# Patient Record
Sex: MALE | Race: WHITE | NOT HISPANIC OR LATINO | URBAN - METROPOLITAN AREA
[De-identification: names, ages, dates, MRNs, and addresses within clinical notes are randomized per-mention and may not be internally consistent; named-entity substitution may affect disease eponyms.]

---

## 2019-12-21 ENCOUNTER — INPATIENT (INPATIENT)
Facility: HOSPITAL | Age: 54
LOS: 2 days | Discharge: HOME | End: 2019-12-24
Attending: INTERNAL MEDICINE | Admitting: INTERNAL MEDICINE
Payer: COMMERCIAL

## 2019-12-21 VITALS
DIASTOLIC BLOOD PRESSURE: 65 MMHG | HEART RATE: 68 BPM | WEIGHT: 214.95 LBS | SYSTOLIC BLOOD PRESSURE: 133 MMHG | RESPIRATION RATE: 18 BRPM | TEMPERATURE: 99 F | OXYGEN SATURATION: 98 %

## 2019-12-21 LAB
ALBUMIN SERPL ELPH-MCNC: 4.5 G/DL — SIGNIFICANT CHANGE UP (ref 3.5–5.2)
ALP SERPL-CCNC: 101 U/L — SIGNIFICANT CHANGE UP (ref 30–115)
ALT FLD-CCNC: 31 U/L — SIGNIFICANT CHANGE UP (ref 0–41)
ANION GAP SERPL CALC-SCNC: 18 MMOL/L — HIGH (ref 7–14)
AST SERPL-CCNC: 18 U/L — SIGNIFICANT CHANGE UP (ref 0–41)
BASOPHILS # BLD AUTO: 0.1 K/UL — SIGNIFICANT CHANGE UP (ref 0–0.2)
BASOPHILS NFR BLD AUTO: 0.5 % — SIGNIFICANT CHANGE UP (ref 0–1)
BILIRUB SERPL-MCNC: 0.4 MG/DL — SIGNIFICANT CHANGE UP (ref 0.2–1.2)
BUN SERPL-MCNC: 15 MG/DL — SIGNIFICANT CHANGE UP (ref 10–20)
CALCIUM SERPL-MCNC: 9.3 MG/DL — SIGNIFICANT CHANGE UP (ref 8.5–10.1)
CHLORIDE SERPL-SCNC: 102 MMOL/L — SIGNIFICANT CHANGE UP (ref 98–110)
CO2 SERPL-SCNC: 22 MMOL/L — SIGNIFICANT CHANGE UP (ref 17–32)
CREAT SERPL-MCNC: 1.2 MG/DL — SIGNIFICANT CHANGE UP (ref 0.7–1.5)
EOSINOPHIL # BLD AUTO: 0.1 K/UL — SIGNIFICANT CHANGE UP (ref 0–0.7)
EOSINOPHIL NFR BLD AUTO: 0.5 % — SIGNIFICANT CHANGE UP (ref 0–8)
GLUCOSE SERPL-MCNC: 182 MG/DL — HIGH (ref 70–99)
HCT VFR BLD CALC: 44.1 % — SIGNIFICANT CHANGE UP (ref 42–52)
HGB BLD-MCNC: 14.3 G/DL — SIGNIFICANT CHANGE UP (ref 14–18)
IMM GRANULOCYTES NFR BLD AUTO: 0.5 % — HIGH (ref 0.1–0.3)
LYMPHOCYTES # BLD AUTO: 12.2 % — LOW (ref 20.5–51.1)
LYMPHOCYTES # BLD AUTO: 2.26 K/UL — SIGNIFICANT CHANGE UP (ref 1.2–3.4)
MAGNESIUM SERPL-MCNC: 2.1 MG/DL — SIGNIFICANT CHANGE UP (ref 1.8–2.4)
MCHC RBC-ENTMCNC: 27.2 PG — SIGNIFICANT CHANGE UP (ref 27–31)
MCHC RBC-ENTMCNC: 32.4 G/DL — SIGNIFICANT CHANGE UP (ref 32–37)
MCV RBC AUTO: 83.8 FL — SIGNIFICANT CHANGE UP (ref 80–94)
MONOCYTES # BLD AUTO: 1.16 K/UL — HIGH (ref 0.1–0.6)
MONOCYTES NFR BLD AUTO: 6.3 % — SIGNIFICANT CHANGE UP (ref 1.7–9.3)
NEUTROPHILS # BLD AUTO: 14.83 K/UL — HIGH (ref 1.4–6.5)
NEUTROPHILS NFR BLD AUTO: 80 % — HIGH (ref 42.2–75.2)
NRBC # BLD: 0 /100 WBCS — SIGNIFICANT CHANGE UP (ref 0–0)
PLATELET # BLD AUTO: 345 K/UL — SIGNIFICANT CHANGE UP (ref 130–400)
POTASSIUM SERPL-MCNC: 3.3 MMOL/L — LOW (ref 3.5–5)
POTASSIUM SERPL-SCNC: 3.3 MMOL/L — LOW (ref 3.5–5)
PROT SERPL-MCNC: 7.5 G/DL — SIGNIFICANT CHANGE UP (ref 6–8)
RBC # BLD: 5.26 M/UL — SIGNIFICANT CHANGE UP (ref 4.7–6.1)
RBC # FLD: 13.3 % — SIGNIFICANT CHANGE UP (ref 11.5–14.5)
SODIUM SERPL-SCNC: 142 MMOL/L — SIGNIFICANT CHANGE UP (ref 135–146)
TROPONIN T SERPL-MCNC: <0.01 NG/ML — SIGNIFICANT CHANGE UP
WBC # BLD: 18.55 K/UL — HIGH (ref 4.8–10.8)
WBC # FLD AUTO: 18.55 K/UL — HIGH (ref 4.8–10.8)

## 2019-12-21 PROCEDURE — 99285 EMERGENCY DEPT VISIT HI MDM: CPT

## 2019-12-21 PROCEDURE — 93010 ELECTROCARDIOGRAM REPORT: CPT

## 2019-12-21 RX ORDER — SODIUM CHLORIDE 9 MG/ML
1000 INJECTION, SOLUTION INTRAVENOUS ONCE
Refills: 0 | Status: COMPLETED | OUTPATIENT
Start: 2019-12-21 | End: 2019-12-21

## 2019-12-21 RX ORDER — PROCHLORPERAZINE MALEATE 5 MG
10 TABLET ORAL ONCE
Refills: 0 | Status: COMPLETED | OUTPATIENT
Start: 2019-12-21 | End: 2019-12-21

## 2019-12-21 RX ORDER — SODIUM CHLORIDE 9 MG/ML
1000 INJECTION INTRAMUSCULAR; INTRAVENOUS; SUBCUTANEOUS ONCE
Refills: 0 | Status: COMPLETED | OUTPATIENT
Start: 2019-12-21 | End: 2019-12-21

## 2019-12-21 RX ORDER — ONDANSETRON 8 MG/1
4 TABLET, FILM COATED ORAL ONCE
Refills: 0 | Status: COMPLETED | OUTPATIENT
Start: 2019-12-21 | End: 2019-12-21

## 2019-12-21 RX ADMIN — SODIUM CHLORIDE 1000 MILLILITER(S): 9 INJECTION INTRAMUSCULAR; INTRAVENOUS; SUBCUTANEOUS at 21:12

## 2019-12-21 RX ADMIN — SODIUM CHLORIDE 1000 MILLILITER(S): 9 INJECTION INTRAMUSCULAR; INTRAVENOUS; SUBCUTANEOUS at 23:00

## 2019-12-21 RX ADMIN — SODIUM CHLORIDE 1000 MILLILITER(S): 9 INJECTION, SOLUTION INTRAVENOUS at 23:58

## 2019-12-21 RX ADMIN — Medication 104 MILLIGRAM(S): at 21:15

## 2019-12-21 RX ADMIN — ONDANSETRON 4 MILLIGRAM(S): 8 TABLET, FILM COATED ORAL at 21:54

## 2019-12-21 RX ADMIN — Medication 10 MILLIGRAM(S): at 22:15

## 2019-12-21 NOTE — ED PROVIDER NOTE - PROGRESS NOTE DETAILS
patient feels better but still has trouble on ambulation. + ataxia.   spoke with neuro NP Marquise, request CTA head/neck. if neg, patient's NIH scale is 0, can be placed in obs for MRI CTA head/neck neg for large vessels occlusion, will place in obs for MRI

## 2019-12-21 NOTE — ED PROVIDER NOTE - PHYSICAL EXAMINATION
CONSTITUTIONAL: Well-appearing; well-nourished; in no apparent distress.   EYES: PERRL; EOM intact.   ENT: normal nose; no rhinorrhea; normal pharynx with no tonsillar hypertrophy.   NECK: Supple; non-tender; no cervical lymphadenopathy. No JVD.   CARDIOVASCULAR: Normal S1, S2; no murmurs, rubs, or gallops.   RESPIRATORY: Normal chest excursion with respiration; breath sounds clear and equal bilaterally; no wheezes, rhonchi, or rales.  GI/: Normal bowel sounds; non-distended; non-tender; no palpable organomegaly.   MS: No evidence of trauma or deformity. Non-tender to palpation. No scoliosis. No CVA tenderness. Normal ROM in all four extremities; non-tender to palpation; distal pulses are normal.   SKIN: Normal for age and race; warm; dry; good turgor; no apparent lesions or exudate.   NEURO/PSYCH: A & O x 4; CN II-XII grossly unremarkable. no drifting. strength equal to b/l upper and lower extremities. speaking coherently. nml cerebellum test.

## 2019-12-21 NOTE — ED ADULT NURSE NOTE - ED STAT RN HANDOFF DETAILS
ENDORSED PT TO STROKE UNIT RN, PT ON CARDIAC MONITOR, IV PATENT, ALERT AND ORIENTED, WILL CONTINUE TO MONITOR

## 2019-12-21 NOTE — ED PROVIDER NOTE - CLINICAL SUMMARY MEDICAL DECISION MAKING FREE TEXT BOX
Pt in ER c/o dizziness, ataxia, n/v.  No neuro deficits in ER.  Head Ct neg.  CTA head/neck with no large vessel occlusion.  seen by neuro in ER, rec. obs stay for further w/u.

## 2019-12-21 NOTE — ED ADULT NURSE NOTE - OBJECTIVE STATEMENT
Pt came to ED c/o vomiting, dizziness and weakness. As per pt he was at work then got dizzy, weak and started vomiting. States had 3 episodes of vomiting for today. Denies headache, N/V/D, chest pain. A,Ox4, no SOB or distress at this time.

## 2019-12-21 NOTE — ED PROVIDER NOTE - NS ED ROS FT
Constitutional: no fever, chills, no recent weight loss, change in appetite or malaise  Eyes: no redness/discharge/pain/vision changes  ENT: no rhinorrhea/ear pain/sore throat  Cardiac: No chest pain, SOB or edema.  Respiratory: No cough or respiratory distress  GI: + nausea, vomiting no diarrhea or abdominal pain.  : No dysuria, frequency, urgency or hematuria  MS: no pain to back or extremities, no loss of ROM, no weakness  Neuro: + dizziness. No headache or weakness. No LOC.  Skin: No skin rash.  Endocrine: No history of thyroid disease or diabetes.

## 2019-12-21 NOTE — ED ADULT NURSE NOTE - NSIMPLEMENTINTERV_GEN_ALL_ED
Implemented All Universal Safety Interventions:  [address] to call system. Call bell, personal items and telephone within reach. Instruct patient to call for assistance. Room bathroom lighting operational. Non-slip footwear when patient is off stretcher. Physically safe environment: no spills, clutter or unnecessary equipment. Stretcher in lowest position, wheels locked, appropriate side rails in place.

## 2019-12-21 NOTE — ED PROVIDER NOTE - ATTENDING CONTRIBUTION TO CARE
52 y/o male with no sig pmhx in ER with c/o feeling dizzy.  Pt states he was eating chinese this evening for dinner and shortly after he started eating he became diaphoretic and felt very lightheaded/dizzy.  felt like the room was spinning, worse when moving, difficulty walking due to dizziness. No syncope/near syncope.  + nausea and multiple episodes of vomiting.  no HA. no visual changes. no cp/sob.  No abdominal pain initially, states abdomen feels sore now after vomiting.  no motor weakness or paresthesias. Pt feeling better now.   PE - nad, nc/at, eomi, perrl, op - clear, mmm, neck supple, cta b/l, no w/r/r, rrr, abd- soft, nt/nd, nabs, from x 4, no swelling, A&O x 3, cn 2-12 intact, clear speech, motor 5/5 b/l UE/LE, no sensory deficits, FTN and HKS intact b/l.  -ivf, check labs, head ct, re-eval.

## 2019-12-21 NOTE — ED PROVIDER NOTE - OBJECTIVE STATEMENT
54 yo male no sig hx present c/o dizziness and vomiting started about 1 hour ago. patient reported he just started eating his dinner and suddenly felt warm and sweaty. He then started feeling dizzy and unable to walk 52 yo male no sig hx present c/o dizziness and vomiting started about 1 hour ago. patient reported he just started eating his dinner and suddenly felt warm and sweaty. He then started feeling dizzy and unable to walk so he called his friend at work who called EMS. Patient then started multiple NB/NB vomiting. denies HA/chest pain/abd pain/diarrhea/urinary sxs/ ear pain and tinnitus. denies hx of vertigo.

## 2019-12-22 LAB
APPEARANCE UR: CLEAR — SIGNIFICANT CHANGE UP
BILIRUB UR-MCNC: NEGATIVE — SIGNIFICANT CHANGE UP
COLOR SPEC: SIGNIFICANT CHANGE UP
DIFF PNL FLD: NEGATIVE — SIGNIFICANT CHANGE UP
GLUCOSE UR QL: NEGATIVE — SIGNIFICANT CHANGE UP
KETONES UR-MCNC: NEGATIVE — SIGNIFICANT CHANGE UP
LEUKOCYTE ESTERASE UR-ACNC: NEGATIVE — SIGNIFICANT CHANGE UP
NITRITE UR-MCNC: NEGATIVE — SIGNIFICANT CHANGE UP
PH UR: 6 — SIGNIFICANT CHANGE UP (ref 5–8)
PROT UR-MCNC: NEGATIVE — SIGNIFICANT CHANGE UP
SP GR SPEC: 1.01 — SIGNIFICANT CHANGE UP (ref 1.01–1.02)
UROBILINOGEN FLD QL: SIGNIFICANT CHANGE UP

## 2019-12-22 PROCEDURE — 99253 IP/OBS CNSLTJ NEW/EST LOW 45: CPT

## 2019-12-22 PROCEDURE — 99221 1ST HOSP IP/OBS SF/LOW 40: CPT | Mod: GC

## 2019-12-22 PROCEDURE — 70498 CT ANGIOGRAPHY NECK: CPT | Mod: 26

## 2019-12-22 PROCEDURE — 70450 CT HEAD/BRAIN W/O DYE: CPT | Mod: 26,59

## 2019-12-22 PROCEDURE — 71045 X-RAY EXAM CHEST 1 VIEW: CPT | Mod: 26

## 2019-12-22 PROCEDURE — 99220: CPT

## 2019-12-22 PROCEDURE — 70496 CT ANGIOGRAPHY HEAD: CPT | Mod: 26

## 2019-12-22 PROCEDURE — 70551 MRI BRAIN STEM W/O DYE: CPT | Mod: 26

## 2019-12-22 RX ORDER — HEPARIN SODIUM 5000 [USP'U]/ML
5000 INJECTION INTRAVENOUS; SUBCUTANEOUS EVERY 8 HOURS
Refills: 0 | Status: DISCONTINUED | OUTPATIENT
Start: 2019-12-22 | End: 2019-12-24

## 2019-12-22 RX ORDER — POTASSIUM CHLORIDE 20 MEQ
40 PACKET (EA) ORAL ONCE
Refills: 0 | Status: DISCONTINUED | OUTPATIENT
Start: 2019-12-22 | End: 2019-12-22

## 2019-12-22 RX ORDER — SODIUM CHLORIDE 9 MG/ML
1000 INJECTION INTRAMUSCULAR; INTRAVENOUS; SUBCUTANEOUS
Refills: 0 | Status: DISCONTINUED | OUTPATIENT
Start: 2019-12-22 | End: 2019-12-24

## 2019-12-22 RX ORDER — POTASSIUM CHLORIDE 20 MEQ
40 PACKET (EA) ORAL ONCE
Refills: 0 | Status: COMPLETED | OUTPATIENT
Start: 2019-12-22 | End: 2019-12-22

## 2019-12-22 RX ORDER — PANTOPRAZOLE SODIUM 20 MG/1
40 TABLET, DELAYED RELEASE ORAL
Refills: 0 | Status: DISCONTINUED | OUTPATIENT
Start: 2019-12-22 | End: 2019-12-24

## 2019-12-22 RX ORDER — CLOPIDOGREL BISULFATE 75 MG/1
75 TABLET, FILM COATED ORAL DAILY
Refills: 0 | Status: DISCONTINUED | OUTPATIENT
Start: 2019-12-22 | End: 2019-12-22

## 2019-12-22 RX ORDER — INFLUENZA VIRUS VACCINE 15; 15; 15; 15 UG/.5ML; UG/.5ML; UG/.5ML; UG/.5ML
0.5 SUSPENSION INTRAMUSCULAR ONCE
Refills: 0 | Status: DISCONTINUED | OUTPATIENT
Start: 2019-12-22 | End: 2019-12-24

## 2019-12-22 RX ORDER — ATORVASTATIN CALCIUM 80 MG/1
80 TABLET, FILM COATED ORAL AT BEDTIME
Refills: 0 | Status: DISCONTINUED | OUTPATIENT
Start: 2019-12-22 | End: 2019-12-24

## 2019-12-22 RX ORDER — ASPIRIN/CALCIUM CARB/MAGNESIUM 324 MG
324 TABLET ORAL ONCE
Refills: 0 | Status: COMPLETED | OUTPATIENT
Start: 2019-12-22 | End: 2019-12-22

## 2019-12-22 RX ORDER — SENNA PLUS 8.6 MG/1
1 TABLET ORAL DAILY
Refills: 0 | Status: DISCONTINUED | OUTPATIENT
Start: 2019-12-22 | End: 2019-12-24

## 2019-12-22 RX ORDER — ONDANSETRON 8 MG/1
4 TABLET, FILM COATED ORAL ONCE
Refills: 0 | Status: COMPLETED | OUTPATIENT
Start: 2019-12-22 | End: 2019-12-22

## 2019-12-22 RX ORDER — MECLIZINE HCL 12.5 MG
25 TABLET ORAL EVERY 6 HOURS
Refills: 0 | Status: DISCONTINUED | OUTPATIENT
Start: 2019-12-22 | End: 2019-12-24

## 2019-12-22 RX ORDER — SIMVASTATIN 20 MG/1
40 TABLET, FILM COATED ORAL AT BEDTIME
Refills: 0 | Status: DISCONTINUED | OUTPATIENT
Start: 2019-12-22 | End: 2019-12-22

## 2019-12-22 RX ORDER — POLYETHYLENE GLYCOL 3350 17 G/17G
17 POWDER, FOR SOLUTION ORAL DAILY
Refills: 0 | Status: DISCONTINUED | OUTPATIENT
Start: 2019-12-22 | End: 2019-12-24

## 2019-12-22 RX ORDER — ASPIRIN/CALCIUM CARB/MAGNESIUM 324 MG
81 TABLET ORAL DAILY
Refills: 0 | Status: DISCONTINUED | OUTPATIENT
Start: 2019-12-22 | End: 2019-12-24

## 2019-12-22 RX ORDER — ASPIRIN/CALCIUM CARB/MAGNESIUM 324 MG
300 TABLET ORAL DAILY
Refills: 0 | Status: DISCONTINUED | OUTPATIENT
Start: 2019-12-22 | End: 2019-12-22

## 2019-12-22 RX ADMIN — Medication 40 MILLIEQUIVALENT(S): at 21:50

## 2019-12-22 RX ADMIN — HEPARIN SODIUM 5000 UNIT(S): 5000 INJECTION INTRAVENOUS; SUBCUTANEOUS at 21:50

## 2019-12-22 RX ADMIN — ONDANSETRON 4 MILLIGRAM(S): 8 TABLET, FILM COATED ORAL at 03:42

## 2019-12-22 RX ADMIN — ATORVASTATIN CALCIUM 80 MILLIGRAM(S): 80 TABLET, FILM COATED ORAL at 21:49

## 2019-12-22 RX ADMIN — HEPARIN SODIUM 5000 UNIT(S): 5000 INJECTION INTRAVENOUS; SUBCUTANEOUS at 17:23

## 2019-12-22 RX ADMIN — SODIUM CHLORIDE 1000 MILLILITER(S): 9 INJECTION, SOLUTION INTRAVENOUS at 06:00

## 2019-12-22 RX ADMIN — Medication 324 MILLIGRAM(S): at 06:21

## 2019-12-22 RX ADMIN — Medication 25 MILLIGRAM(S): at 06:21

## 2019-12-22 RX ADMIN — CLOPIDOGREL BISULFATE 75 MILLIGRAM(S): 75 TABLET, FILM COATED ORAL at 12:10

## 2019-12-22 RX ADMIN — SODIUM CHLORIDE 50 MILLILITER(S): 9 INJECTION INTRAMUSCULAR; INTRAVENOUS; SUBCUTANEOUS at 15:42

## 2019-12-22 NOTE — ED ADULT NURSE REASSESSMENT NOTE - NS ED NURSE REASSESS COMMENT FT1
pt assessed, no signs of distress, resting comfortably, awaiting MRI, VSS, will cont to assess and monitor.

## 2019-12-22 NOTE — ED CDU PROVIDER INITIAL DAY NOTE - MEDICAL DECISION MAKING DETAILS
placed in observation with neurology consult for evaluation of posterior vertigo associated with vomiting, will obtain mri for further evaluation.

## 2019-12-22 NOTE — H&P ADULT - HISTORY OF PRESENT ILLNESS
53 year old gentleman with no significant PMH presents to the ED with acute onset room spinning dizziness. Patient developed dizziness at 1800 with associated nausea and vomiting. Compazine and Zofran effective but gait ataxia persists.   patient reported he just started eating his dinner and suddenly felt warm and sweaty. He then started feeling dizzy and unable to walk so he called his friend at work who called EMS. Patient then started multiple NB/NB vomiting. denies HA/chest pain/abd pain/diarrhea/urinary sxs/ ear pain and tinnitus. denies hx of vertigo.    vitals on admission were as: 133/65 mmHg- 68 beats/min - 98% room air- temp 98.8 F    CT angio head and neck and CT head were negative   MRI positive for acute cerebellar ischemia   admitted to stroke unit 53 year old gentleman with no significant PMH presents to the ED with acute onset room spinning dizziness. Patient developed dizziness at 1800 with associated nausea and vomiting. Compazine and Zofran effective but gait ataxia persists.   patient reported he just started eating his dinner and suddenly felt warm and sweaty. He then started feeling dizzy and unable to walk so he called his friend at work who called EMS. Patient then started multiple NB/NB vomiting. denies HA/chest pain/abd pain/diarrhea/urinary sxs/ ear pain and tinnitus. denies hx of vertigo.  patient never took aspirin over the counter     vitals on admission were as: 133/65 mmHg- 68 beats/min - 98% room air- temp 98.8 F    CT angio head and neck and CT head were negative   MRI positive for acute cerebellar ischemia   admitted to stroke unit

## 2019-12-22 NOTE — ED CDU PROVIDER INITIAL DAY NOTE - OBJECTIVE STATEMENT
54y/o male with no significant pmh, pt. c/o acute onset of dizziness associated with vomiting. sx started pta. dizziness is described as room spinning sensation. dizziness is worse when ambulating. + unsteady gait. denies ha, focal weakness, slurred speech, cp, sob, abdominal pain. not a smoker. no family hx of cva. + family hx of cardiac disease.

## 2019-12-22 NOTE — ED CDU PROVIDER DISPOSITION NOTE - CLINICAL COURSE
patient evaluated for vertigo. placed in obs after nml head ct, MRI shows acute infarct. discussed with neurology, will start on plavix, statin, recommended PAOLA and hypercoagulable work up

## 2019-12-22 NOTE — ED CDU PROVIDER INITIAL DAY NOTE - ATTENDING CONTRIBUTION TO CARE
Vertigo occuring at 1800 with associated nausea and vomiting. improved with compazine/meclizine/zofran however intially patient had worsening symptoms with wakling and gait ataxia persists. CTH was nml. placed in obs for posterior stroke work up.

## 2019-12-22 NOTE — H&P ADULT - ATTENDING COMMENTS
53 year old gentleman with no significant PMH presents to the ED with acute onset room spinning dizziness. Patient developed dizziness at 1800 with associated nausea and vomiting. Compazine and Zofran effective but gait ataxia persists. Patient reported he just started eating his dinner and suddenly felt warm and sweaty. He then started feeling dizzy and unable to walk so he called his friend at work who called EMS. Patient then started multiple NB/NB vomiting. denies HA/chest pain/abd pain/diarrhea/urinary sxs/ ear pain and tinnitus. denies hx of vertigo.  patient never took aspirin over the counter.   Today feels better. Father with CVA in his 40s but smoker.    Denies CP, SOB, N/V/D/C/AP, cough, F, chills, new focal weakness, HA, vision changes, dysuria, or urinary symptoms, blood in stool.    ROS: all systems unremarkable except as above.     Gen: NAD, AA0x3  HEENT: PERRLA, EOMI, no LAD  CV: nl S1 S2  Resp: decreased BS b/l  GI: NT/ND/S +BS  MS: no c/c/e, +pulses  Neuro: nonfocal, +reflexes    EKG - nonspecific changes (my read)  Chart and consultant notes personally reviewed.  Care Discussed with Consultants/Other Providers/ Housestaff [ x] YES [ ] NO   Radiology, labs, old records personally reviewed.    #Acute CVA  etiology : ischemic stroke; MRI showed Punctate acute infarct in the medial left cerebellar hemisphere/vermis.  Cont stroke unit, monitor telemetry   - aspirin 81 mg, Atorvastatin 80mg as per neurology ( as pt was never on aspirin)   - f/u hypercoagulable work up   - Activity: ambulate with assistance, neuro checks Q4H  - Fall risk precautions  - Fu neurology   - will keep meclizine as has dizziness on position change   - avoid hypotension keep bp > 120 mmHg   -possible JUDIE - outpt sleep study  -will need event monitor/loop on d/c    #elevated WBC with left shift  reactive due to vomiting  no signs of infection       GI ppx: Po protonix 40 mg QD  DVT ppx: Heparin SC   Activity : Physical therapy, rehab   Diet : DASH/TLC  Dispo: Home   Full Code     #Progress Note Handoff  Pending (specify): Neuro, PT clearance__  Pt/Family discussion: Pt/family informed and agree with the current plan  Disposition: Home______/SNF_______/4A________/To be determined_x_______/Waiting for Auth_____

## 2019-12-22 NOTE — H&P ADULT - NSICDXFAMILYHX_GEN_ALL_CORE_FT
FAMILY HISTORY:  Father  Still living? Unknown  Family history of coronary artery disease, Age at diagnosis: 41-50 FAMILY HISTORY:  Family history of cerebrovascular accident (CVA) in father, in his 40s    Father  Still living? Unknown  Family history of coronary artery disease, Age at diagnosis: 41-50

## 2019-12-22 NOTE — PROGRESS NOTE ADULT - SUBJECTIVE AND OBJECTIVE BOX
JENNI PETERS    Chief Complaint: Dizziness    HPI:  53 year old gentleman with no significant PMH presents to the ED with acute onset room spinning dizziness. Patient developed dizziness at 1800 with associated nausea and vomiting. Compazine and Zofran effective but gait ataxia persists. CT angio head and neck and CT head were negative. MRI positive for acute cerebellar ischemic stroke.       Relevant PMH:  [] Prior ischemic stroke/TIA  [] Afib  []CAD  []HTN  []DLD  []DM []PVD []Obesity [] Sedintary lifestyle []CHF  []JUDIE  []Cancer Hx     Social History: [] Smoking []  Drug Use: []   Alcohol Use:   [] Other:      Possible Location of Stroke:  Cerebellar stroke.  Possible Cause of Stroke:  Unknown at this time, will have a better understanding post stroke workup.  Relevant Cerebral Imaging:  < from: MR Head No Cont (12.22.19 @ 09:49) >  IMPRESSION:     1.  Punctate acute infarct in the medial left cerebellar hemisphere/vermis.    2.  Multiple foci of susceptibility effect within bilateral cerebellar hemispheres. These can reflect chronic microhemorrhages seen in the setting of hypertension.    3.  Mild chronic microvascular changes. Chronic lacunar infarct within the left anterior frontal white matter.      Relevant Cervicocerebral Imaging:  CT Angio Neck w/ IV Cont:   EXAM:  CT ANGIO NECK (W)AW IC        EXAM:  CT ANGIO BRAIN (W)AW IC        IMPRESSION:     No evidence of major vascular stenosis or occlusion.      Relevant blood tests:  pending  Relevant cardiac rhythm monitoring:  no reported events.   Relevant Cardiac Structure:(TTE/PAOLA +/-):[]No intracardiac thrombus/[] no vegetation/[]no akynesia/EF:  pending    Home Medications:      MEDICATIONS  (STANDING):  aspirin enteric coated 81 milliGRAM(s) Oral daily  atorvastatin 80 milliGRAM(s) Oral at bedtime  heparin  Injectable 5000 Unit(s) SubCutaneous every 8 hours  influenza   Vaccine 0.5 milliLiter(s) IntraMuscular once  pantoprazole    Tablet 40 milliGRAM(s) Oral before breakfast  polyethylene glycol 3350 17 Gram(s) Oral daily  potassium chloride   Powder 40 milliEquivalent(s) Oral once  senna 1 Tablet(s) Oral daily  sodium chloride 0.9%. 1000 milliLiter(s) (50 mL/Hr) IV Continuous <Continuous>      PT/OT/Speech/Rehab/S&Swr:    Exam:    Vital Signs Last 24 Hrs  T(C): 36.1 (22 Dec 2019 14:32), Max: 37.1 (21 Dec 2019 20:26)  T(F): 96.9 (22 Dec 2019 14:32), Max: 98.8 (21 Dec 2019 20:26)  HR: 64 (22 Dec 2019 17:31) (59 - 73)  BP: 125/57 (22 Dec 2019 17:31) (101/53 - 141/75)  BP(mean): --  RR: 19 (22 Dec 2019 14:32) (18 - 19)  SpO2: 97% (22 Dec 2019 11:41) (97% - 98%)    NIHSS      LOC:       1a:  0  1b(Questions):    0       1c(Instructions):    0         Best Gaze:0  Visual:0  Motor:                 RUE:  0   RLE:  0   LUE:  0  LLE:    0 FACE: 00    Limb Ataxia:0  Sensory:     0  Language:     0  Dysarthria:        0  Extinction and Inattention:0    NIHSS on admission:     0     NIHSS yesterday:      0   NIHSS today:  0           m-RS:0    Impression:  53 year old gentleman with no significant PMH presents to the ED with acute onset room spinning dizziness. Patient developed dizziness at home with associated nausea and vomiting. Compazine and Zofran effective but gait ataxia persists. CTH failed to reveal acute intracranial pathology. CTA head and neck were unremarkable, MRI brain revealed a cerebellar ischemic stroke. Etiology of stroke unknown, will have a better understanding post stroke workup.     Suggestion:  TTE with contrast to rule out right to left shunt, akinesia, intracardiac thrombus or vegetation.  LDL, A1C  Telemetry monitoring.   PT OT Rehab      Disposition:  Stroke Unit.     Marquise Borges NP  [phone] JENNI PETERS    Chief Complaint: Dizziness    HPI:  53 year old gentleman with no significant PMH presents to the ED with acute onset room spinning dizziness. Patient developed dizziness at 1800 with associated nausea and vomiting. Compazine and Zofran effective but gait ataxia persists. CT angio head and neck and CT head were negative. MRI positive for acute cerebellar ischemic stroke.       Relevant PMH:  [] Prior ischemic stroke/TIA  [] Afib  []CAD  []HTN  []DLD  []DM []PVD []Obesity [] Sedintary lifestyle []CHF  []JUDIE  []Cancer Hx     Social History: [] Smoking []  Drug Use: []   Alcohol Use:   [] Other:      Possible Location of Stroke:  Cerebellar stroke.  Possible Cause of Stroke:  Unknown at this time, will have a better understanding post stroke workup.  Relevant Cerebral Imaging:  < from: MR Head No Cont (12.22.19 @ 09:49) >  IMPRESSION:     1.  Punctate acute infarct in the medial left cerebellar hemisphere/vermis.    2.  Multiple foci of susceptibility effect within bilateral cerebellar hemispheres. These can reflect chronic microhemorrhages seen in the setting of hypertension.    3.  Mild chronic microvascular changes. Chronic lacunar infarct within the left anterior frontal white matter.      Relevant Cervicocerebral Imaging:  CT Angio Neck w/ IV Cont:   EXAM:  CT ANGIO NECK (W)AW IC        EXAM:  CT ANGIO BRAIN (W)AW IC        IMPRESSION:     No evidence of major vascular stenosis or occlusion.      Relevant blood tests:  pending  Relevant cardiac rhythm monitoring:  no reported events.   Relevant Cardiac Structure:(TTE/PAOLA +/-):[]No intracardiac thrombus/[] no vegetation/[]no akynesia/EF:  pending    Home Medications:      MEDICATIONS  (STANDING):  aspirin enteric coated 81 milliGRAM(s) Oral daily  atorvastatin 80 milliGRAM(s) Oral at bedtime  heparin  Injectable 5000 Unit(s) SubCutaneous every 8 hours  influenza   Vaccine 0.5 milliLiter(s) IntraMuscular once  pantoprazole    Tablet 40 milliGRAM(s) Oral before breakfast  polyethylene glycol 3350 17 Gram(s) Oral daily  potassium chloride   Powder 40 milliEquivalent(s) Oral once  senna 1 Tablet(s) Oral daily  sodium chloride 0.9%. 1000 milliLiter(s) (50 mL/Hr) IV Continuous <Continuous>      PT/OT/Speech/Rehab/S&Swr:    Exam:    Vital Signs Last 24 Hrs  T(C): 36.1 (22 Dec 2019 14:32), Max: 37.1 (21 Dec 2019 20:26)  T(F): 96.9 (22 Dec 2019 14:32), Max: 98.8 (21 Dec 2019 20:26)  HR: 64 (22 Dec 2019 17:31) (59 - 73)  BP: 125/57 (22 Dec 2019 17:31) (101/53 - 141/75)  BP(mean): --  RR: 19 (22 Dec 2019 14:32) (18 - 19)  SpO2: 97% (22 Dec 2019 11:41) (97% - 98%)    NIHSS      LOC:       1a:  0  1b(Questions):    0       1c(Instructions):    0         Best Gaze:0  Visual:0  Motor:                 RUE:  0   RLE:  0   LUE:  0  LLE:    0 FACE: 00    Limb Ataxia:0  Sensory:     0  Language:     0  Dysarthria:        0  Extinction and Inattention:0    NIHSS on admission:     0     NIHSS yesterday:      0   NIHSS today:  0           m-RS:0    Impression:  53 year old gentleman with no significant PMH presents to the ED with acute onset room spinning dizziness. Patient developed dizziness at home with associated nausea and vomiting. Compazine and Zofran effective but gait ataxia persists. CTH failed to reveal acute intracranial pathology. CTA head and neck were unremarkable, MRI brain revealed a cerebellar ischemic stroke. Etiology of stroke unknown, will have a better understanding post stroke workup.     Suggestion:  PAOLA  Hypercoagulable labs  LDL, A1C  Telemetry monitoring.   PT OT Rehab      Disposition:  Continue Stroke Unit.     Marquise Borges NP  [phone]

## 2019-12-22 NOTE — H&P ADULT - NSHPLABSRESULTS_GEN_ALL_CORE
14.3   18.55 )-----------( 345      ( 21 Dec 2019 21:05 )             44.1       12-21    142  |  102  |  15  ----------------------------<  182<H>  3.3<L>   |  22  |  1.2    Ca    9.3      21 Dec 2019 21:05  Mg     2.1     12-21    TPro  7.5  /  Alb  4.5  /  TBili  0.4  /  DBili  x   /  AST  18  /  ALT  31  /  AlkPhos  101  12-21      LIVER FUNCTIONS - ( 21 Dec 2019 21:05 )  Alb: 4.5 g/dL / Pro: 7.5 g/dL / ALK PHOS: 101 U/L / ALT: 31 U/L / AST: 18 U/L / GGT: x             CARDIAC MARKERS ( 21 Dec 2019 21:05 )  x     / <0.01 ng/mL / x     / x     / x    < from: MR Head No Cont (12.22.19 @ 09:49) >      IMPRESSION:     1.  Punctate acute infarct in the medial left cerebellar hemisphere/vermis.    2.  Multiple foci of susceptibility effect within bilateral cerebellar hemispheres. These can reflect chronic microhemorrhages seen in the setting of hypertension.    3.  Mild chronic microvascular changes. Chronic lacunar infarct within the left anterior frontal white matter.      < end of copied text > 14.3   18.55 )-----------( 345      ( 21 Dec 2019 21:05 )             44.1       12-21    142  |  102  |  15  ----------------------------<  182<H>  3.3<L>   |  22  |  1.2    Ca    9.3      21 Dec 2019 21:05  Mg     2.1     12-21    TPro  7.5  /  Alb  4.5  /  TBili  0.4  /  DBili  x   /  AST  18  /  ALT  31  /  AlkPhos  101  12-21      LIVER FUNCTIONS - ( 21 Dec 2019 21:05 )  Alb: 4.5 g/dL / Pro: 7.5 g/dL / ALK PHOS: 101 U/L / ALT: 31 U/L / AST: 18 U/L / GGT: x             CARDIAC MARKERS ( 21 Dec 2019 21:05 )  x     / <0.01 ng/mL / x     / x     / x    < from: MR Head No Cont (12.22.19 @ 09:49) >      IMPRESSION:     1.  Punctate acute infarct in the medial left cerebellar hemisphere/vermis.    2.  Multiple foci of susceptibility effect within bilateral cerebellar hemispheres. These can reflect chronic microhemorrhages seen in the setting of hypertension.    3.  Mild chronic microvascular changes. Chronic lacunar infarct within the left anterior frontal white matter.      < end of copied text >    < from: CT Angio Neck w/ IV Cont (12.22.19 @ 05:12) >    IMPRESSION:     No evidence of major vascular stenosis or occlusion.    < end of copied text >    < from: CT Angio Head w/ IV Cont (12.22.19 @ 05:11) >    IMPRESSION:     No evidence of major vascular stenosis or occlusion.    < end of copied text >

## 2019-12-22 NOTE — H&P ADULT - NSHPPHYSICALEXAM_GEN_ALL_CORE
PHYSICAL EXAM:  GENERAL: No acute distress, well-developed  HEAD:  Atraumatic, Normocephalic  EYES: EOMI, PERRLA, conjunctiva and sclera clear  NECK: Supple, no lymphadenopathy, no JVD  CHEST/LUNG: CTAB; No wheezes, rales, or rhonchi  HEART: Regular rate and rhythm; No murmurs, rubs, or gallops  ABDOMEN: Soft, non-tender, non-distended; normal bowel sounds, no organomegaly  EXTREMITIES:  2+ peripheral pulses b/l, No clubbing, cyanosis, or edema  NEUROLOGY: A&O x 3, no focal deficits  motor 5/5- sensation intact all over  romberg neg - no dysmetria- no diadochokinesia   SKIN: No rashes or lesions

## 2019-12-22 NOTE — CONSULT NOTE ADULT - ATTENDING COMMENTS
Patient seen and examined and agree with above except as noted.  Reviewed patients history, notes, labs, vitals and imaging personally.  Patient feeling better this morning able to walk without the off balance feeling  Exam normal and NIHSS 0  mrankin 0  Patient walked with me towards bathroom    Plan as above (can be dc'd if MRI brain (-)

## 2019-12-22 NOTE — CONSULT NOTE ADULT - SUBJECTIVE AND OBJECTIVE BOX
Neurology Consult    Patient is a 53y old  Male who presents with a chief complaint of dizziness    HPI:  53 year old gentleman with no significant PMH presents to the ED with acute onset room spinning dizziness. Patient developed dizziness at 1800 with associated nausea and vomiting. Compazine and Zofran effective but gait ataxia persists.    PAST MEDICAL & SURGICAL HISTORY:  No pertinent past medical history  No significant past surgical history      FAMILY HISTORY:  Family history of coronary artery disease (Father)      Social History: (-) x 3    Allergies    No Known Allergies    Intolerances        MEDICATIONS  (STANDING):    MEDICATIONS  (PRN):      Review of systems:    Constitutional: as per HPI  Eyes: No eye pain or discharge  ENMT:  No difficulty hearing; No sinus or throat pain  Neck: No pain or stiffness  Respiratory: No cough, wheezing, chills or hemoptysis  Cardiovascular: No chest pain, palpitations, shortness of breath, dyspnea on exertion  Gastrointestinal: No abdominal pain, nausea, vomiting or hematemesis; No diarrhea or constipation.   Genitourinary: No dysuria, frequency, hematuria or incontinence  Neurological: As per HPI  Skin: No rashes or lesions   Endocrine: No heat or cold intolerance; No hair loss  Musculoskeletal: No joint pain or swelling  Psychiatric: No depression, anxiety, mood swings  Heme/Lymph: No easy bruising or bleeding gums    Vital Signs Last 24 Hrs  T(C): 37.1 (21 Dec 2019 20:26), Max: 37.1 (21 Dec 2019 20:26)  T(F): 98.8 (21 Dec 2019 20:26), Max: 98.8 (21 Dec 2019 20:26)  HR: 68 (21 Dec 2019 20:26) (68 - 68)  BP: 133/65 (21 Dec 2019 20:26) (133/65 - 133/65)  BP(mean): --  RR: 18 (21 Dec 2019 20:26) (18 - 18)  SpO2: 98% (21 Dec 2019 20:26) (98% - 98%)    Examination:  General:  Appearance is consistent with chronologic age.  No abnormal facies.  Gross skin survey within normal limits.    Cognitive/Language:  The patient is oriented to person, place, time and date.  Recent and remote memory intact.  Fund of knowledge is intact and normal.  Language with normal repetition, comprehension and naming.  Nondysarthric.    Eyes: intact VA, VFF.  EOMI patient with lateral gaze nystagmus, skew or reported double vision.  PERRL.  No ptosis/weakness of eyelid closure.    Face:  Facial sensation normal V1 - 3, no facial asymmetry.    Ears/Nose/Throat:  Hearing grossly intact b/l.  Palate elevates midline.  Tongue and uvula midline.   Motor examination:   Normal tone, bulk and range of motion.  No tenderness, twitching, tremors or involuntary movements.  Formal Muscle Strength Testing: (MRC grade R/L) 5/5 UE; 5/5 LE.  No observable drift.  Reflexes:   2+ b/l pectoralis, biceps, triceps, brachioradialis, patella and Achilles.  Plantar response downgoing b/l.  Jaw jerk, Estee, clonus absent.  Sensory examination:   Intact to light touch and pinprick, pain, temperature and proprioception and vibration in all extremities.  Cerebellum:   FTN/HKS intact with normal ANA PAULA in all limbs.  No dysmetria or dysdiadokinesia.     Labs:   CBC Full  -  ( 21 Dec 2019 21:05 )  WBC Count : 18.55 K/uL  RBC Count : 5.26 M/uL  Hemoglobin : 14.3 g/dL  Hematocrit : 44.1 %  Platelet Count - Automated : 345 K/uL  Mean Cell Volume : 83.8 fL  Mean Cell Hemoglobin : 27.2 pg  Mean Cell Hemoglobin Concentration : 32.4 g/dL  Auto Neutrophil # : 14.83 K/uL  Auto Lymphocyte # : 2.26 K/uL  Auto Monocyte # : 1.16 K/uL  Auto Eosinophil # : 0.10 K/uL  Auto Basophil # : 0.10 K/uL  Auto Neutrophil % : 80.0 %  Auto Lymphocyte % : 12.2 %  Auto Monocyte % : 6.3 %  Auto Eosinophil % : 0.5 %  Auto Basophil % : 0.5 %    12-21    142  |  102  |  15  ----------------------------<  182<H>  3.3<L>   |  22  |  1.2    Ca    9.3      21 Dec 2019 21:05  Mg     2.1     12-21    TPro  7.5  /  Alb  4.5  /  TBili  0.4  /  DBili  x   /  AST  18  /  ALT  31  /  AlkPhos  101  12-21    LIVER FUNCTIONS - ( 21 Dec 2019 21:05 )  Alb: 4.5 g/dL / Pro: 7.5 g/dL / ALK PHOS: 101 U/L / ALT: 31 U/L / AST: 18 U/L / GGT: x                   Neuroimaging:  NCHCT: CT Head No Cont:   EXAM:  CT BRAIN        IMPRESSION:     No CT evidence for acute intracranial pathology.

## 2019-12-22 NOTE — H&P ADULT - ASSESSMENT
53 year old gentleman with no significant PMH presents to the ED with acute onset room spinning dizziness. Patient developed dizziness at 1800 with associated nausea and vomiting. Compazine and Zofran effective but gait ataxia persists.    vertigo and ataxia   etiology : ischemic stroke; MRI showed Punctate acute infarct in the medial left cerebellar hemisphere/vermis.  Admit to stroke, monitor telemetry   - plavix 75 mg, Atorvastatin 80mg as per neurology   - fu echo with bubble and hypercoagulable work up   - Diet:  bedside swallow eval if passes start DASH/TLC diet   - Activity: ambulate with assistance, neuro checks Q4H  - Oxygen: O2 via NC; keep O2 saturation > 92%  - Fu lipid panel, HBA1c   - Fall risk precautions  - Fu neurology consult     GI ppx: Po protonix 40 mg QD  DVT ppx: Heparin SC   Activity : Physical therapy, rehab   Diet : DASH/TLC  Dispo: Home   Full Code 53 year old gentleman with no significant PMH presents to the ED with acute onset room spinning dizziness. Patient developed dizziness at 1800 with associated nausea and vomiting. Compazine and Zofran effective but gait ataxia persists.    #Vertigo and ataxia   etiology : ischemic stroke; MRI showed Punctate acute infarct in the medial left cerebellar hemisphere/vermis.  Admit to stroke, monitor telemetry   - aspirin 81 mg, Atorvastatin 80mg as per neurology ( as pt was never on aspirin)   - fu echo with bubble and hypercoagulable work up   - Diet:  bedside swallow eval if passes start DASH/TLC diet   - Activity: ambulate with assistance, neuro checks Q4H  - Oxygen: O2 via NC; keep O2 saturation > 92%  - Fu lipid panel, HBA1c   - Fall risk precautions  - Fu neurology consult   - will keep meclizine as has dizziness on position change   - avoid hypotension keep bp > 120 mmHg     #elevated WBC with left shift  no signs of infection   will monitor for fever   keep off antibiotics for now     GI ppx: Po protonix 40 mg QD  DVT ppx: Heparin SC   Activity : Physical therapy, rehab   Diet : DASH/TLC  Dispo: Home   Full Code 53 year old gentleman with no significant PMH presents to the ED with acute onset room spinning dizziness. Patient developed dizziness at 1800 with associated nausea and vomiting. Compazine and Zofran effective but gait ataxia persists.    #Vertigo and ataxia   etiology : ischemic stroke; MRI showed Punctate acute infarct in the medial left cerebellar hemisphere/vermis.  Admit to stroke, monitor telemetry   - aspirin 81 mg, Atorvastatin 80mg as per neurology ( as pt was never on aspirin)   - fu echo with bubble and hypercoagulable work up   - Diet:  bedside swallow eval if passes start DASH/TLC diet   - Activity: ambulate with assistance, neuro checks Q4H  - Oxygen: O2 via NC; keep O2 saturation > 92%  - Fu lipid panel, HBA1c   - Fall risk precautions  - Fu neurology consult   - will keep meclizine as has dizziness on position change   - avoid hypotension keep bp > 120 mmHg     #elevated WBC with left shift  ? reactive   no signs of infection   will monitor for fever   keep off antibiotics for now   fu for CXR and UA ordered and bld cx     GI ppx: Po protonix 40 mg QD  DVT ppx: Heparin SC   Activity : Physical therapy, rehab   Diet : DASH/TLC  Dispo: Home   Full Code

## 2019-12-22 NOTE — ED CDU PROVIDER DISPOSITION NOTE - NS ED ATTENDING STATEMENT MOD
PROCEDURE [facility]A standard approach             DATE OF SURGERY 11/21/17            DISCHARGE DATE/DESTINATION 11/23 home       OVERALL WELL BEING  1. ARE YOU DOING OK AT HOME?yes       IF NO WHAT HAS YOU CONCERNED? Gout flareup left ankle, sought treatment and is resolving  2. DO YOU HAVE A FOLLOW UP APPOINTMENT SCHEDULED? yes  3. IS YOUR DRESSING CLEAN DRY AND INTACT? yes  4. ARE YOU NOTICING ANY UNUSUAL REDNESS, SWELLING OR DRAINAGE COMING FROM THE INCISION? no  5. DO YOU HAVE A FEVER? no  6. HAVE YOU HAD A BOWEL MOVEMENT? yes  7. ARE YOU TAKING ANYTHING FOR CONSTIPATION? Stool softener  8. ARE YOU WEARING YOUR TOMY STOCKINGS DURING THE DAY AND REMOVING BEFORE BED? yes    MEDICATIONS  1. ARE YOU MEETING YOUR PAIN GOAL?yes  -Pain goal in hospital  5/10  2. DO YOU HAVE ENOUGH PAIN MEDICATION TO LAST UNTIL YOUR POST OP VISIT? Will check    SELF-CARES  1. AMBULATING WITH A WALKER? yes  2. ATTENDING THERAPY? 3. [address] [place] [address]? yes    EDUCATION  1. ATTEND [employer]? yes  2. DID THE EDUCATION  YOU RECEIVED HELP PREPARE YOU FOR SURGERY?  To some extent, has brielle BARAH[place] so was familiar with process althoug class was helpful for PEYTON precautions
I have personally performed a face to face diagnostic evaluation on this patient. I have reviewed the ACP note and agree with the history, exam and plan of care, except as noted.

## 2019-12-22 NOTE — ED CDU PROVIDER INITIAL DAY NOTE - NEURO NEGATIVE STATEMENT, MLM
no loss of consciousness, + unsteady gait, + dizziness, no headache, no sensory deficits, and no weakness.

## 2019-12-22 NOTE — ED CDU PROVIDER DISPOSITION NOTE - ATTENDING CONTRIBUTION TO CARE
evaluated for vertigo, found to have acute stroke on mri, patient to be given aspirin/plavix/statin, admitted to stroke unit.

## 2019-12-23 ENCOUNTER — TRANSCRIPTION ENCOUNTER (OUTPATIENT)
Age: 54
End: 2019-12-23

## 2019-12-23 LAB
ALBUMIN SERPL ELPH-MCNC: 3.8 G/DL — SIGNIFICANT CHANGE UP (ref 3.5–5.2)
ALP SERPL-CCNC: 87 U/L — SIGNIFICANT CHANGE UP (ref 30–115)
ALT FLD-CCNC: 20 U/L — SIGNIFICANT CHANGE UP (ref 0–41)
ANION GAP SERPL CALC-SCNC: 15 MMOL/L — HIGH (ref 7–14)
AST SERPL-CCNC: 14 U/L — SIGNIFICANT CHANGE UP (ref 0–41)
BASOPHILS # BLD AUTO: 0.06 K/UL — SIGNIFICANT CHANGE UP (ref 0–0.2)
BASOPHILS NFR BLD AUTO: 0.6 % — SIGNIFICANT CHANGE UP (ref 0–1)
BILIRUB SERPL-MCNC: 0.5 MG/DL — SIGNIFICANT CHANGE UP (ref 0.2–1.2)
BUN SERPL-MCNC: 11 MG/DL — SIGNIFICANT CHANGE UP (ref 10–20)
CALCIUM SERPL-MCNC: 8.7 MG/DL — SIGNIFICANT CHANGE UP (ref 8.5–10.1)
CHLORIDE SERPL-SCNC: 104 MMOL/L — SIGNIFICANT CHANGE UP (ref 98–110)
CHOLEST SERPL-MCNC: 154 MG/DL — SIGNIFICANT CHANGE UP (ref 100–200)
CO2 SERPL-SCNC: 24 MMOL/L — SIGNIFICANT CHANGE UP (ref 17–32)
CREAT SERPL-MCNC: 1.1 MG/DL — SIGNIFICANT CHANGE UP (ref 0.7–1.5)
EOSINOPHIL # BLD AUTO: 0.15 K/UL — SIGNIFICANT CHANGE UP (ref 0–0.7)
EOSINOPHIL NFR BLD AUTO: 1.5 % — SIGNIFICANT CHANGE UP (ref 0–8)
GLUCOSE SERPL-MCNC: 93 MG/DL — SIGNIFICANT CHANGE UP (ref 70–99)
HCT VFR BLD CALC: 42 % — SIGNIFICANT CHANGE UP (ref 42–52)
HCYS SERPL-MCNC: 11.9 UMOL/L — SIGNIFICANT CHANGE UP
HDLC SERPL-MCNC: 28 MG/DL — LOW
HGB BLD-MCNC: 13.2 G/DL — LOW (ref 14–18)
IMM GRANULOCYTES NFR BLD AUTO: 0.3 % — SIGNIFICANT CHANGE UP (ref 0.1–0.3)
LIPID PNL WITH DIRECT LDL SERPL: 108 MG/DL — SIGNIFICANT CHANGE UP (ref 4–129)
LYMPHOCYTES # BLD AUTO: 3.05 K/UL — SIGNIFICANT CHANGE UP (ref 1.2–3.4)
LYMPHOCYTES # BLD AUTO: 31 % — SIGNIFICANT CHANGE UP (ref 20.5–51.1)
MCHC RBC-ENTMCNC: 26.6 PG — LOW (ref 27–31)
MCHC RBC-ENTMCNC: 31.4 G/DL — LOW (ref 32–37)
MCV RBC AUTO: 84.7 FL — SIGNIFICANT CHANGE UP (ref 80–94)
MONOCYTES # BLD AUTO: 0.75 K/UL — HIGH (ref 0.1–0.6)
MONOCYTES NFR BLD AUTO: 7.6 % — SIGNIFICANT CHANGE UP (ref 1.7–9.3)
NEUTROPHILS # BLD AUTO: 5.81 K/UL — SIGNIFICANT CHANGE UP (ref 1.4–6.5)
NEUTROPHILS NFR BLD AUTO: 59 % — SIGNIFICANT CHANGE UP (ref 42.2–75.2)
NRBC # BLD: 0 /100 WBCS — SIGNIFICANT CHANGE UP (ref 0–0)
PLATELET # BLD AUTO: 280 K/UL — SIGNIFICANT CHANGE UP (ref 130–400)
POTASSIUM SERPL-MCNC: 3.9 MMOL/L — SIGNIFICANT CHANGE UP (ref 3.5–5)
POTASSIUM SERPL-SCNC: 3.9 MMOL/L — SIGNIFICANT CHANGE UP (ref 3.5–5)
PROT SERPL-MCNC: 6.4 G/DL — SIGNIFICANT CHANGE UP (ref 6–8)
RBC # BLD: 4.96 M/UL — SIGNIFICANT CHANGE UP (ref 4.7–6.1)
RBC # FLD: 13.7 % — SIGNIFICANT CHANGE UP (ref 11.5–14.5)
SODIUM SERPL-SCNC: 143 MMOL/L — SIGNIFICANT CHANGE UP (ref 135–146)
TOTAL CHOLESTEROL/HDL RATIO MEASUREMENT: 5.5 RATIO — SIGNIFICANT CHANGE UP (ref 4–5.5)
TRIGL SERPL-MCNC: 124 MG/DL — SIGNIFICANT CHANGE UP (ref 10–149)
WBC # BLD: 9.85 K/UL — SIGNIFICANT CHANGE UP (ref 4.8–10.8)
WBC # FLD AUTO: 9.85 K/UL — SIGNIFICANT CHANGE UP (ref 4.8–10.8)

## 2019-12-23 PROCEDURE — 99223 1ST HOSP IP/OBS HIGH 75: CPT | Mod: AI

## 2019-12-23 PROCEDURE — 93312 ECHO TRANSESOPHAGEAL: CPT | Mod: 26,59

## 2019-12-23 PROCEDURE — 99232 SBSQ HOSP IP/OBS MODERATE 35: CPT

## 2019-12-23 RX ADMIN — PANTOPRAZOLE SODIUM 40 MILLIGRAM(S): 20 TABLET, DELAYED RELEASE ORAL at 06:14

## 2019-12-23 RX ADMIN — ATORVASTATIN CALCIUM 80 MILLIGRAM(S): 80 TABLET, FILM COATED ORAL at 21:50

## 2019-12-23 RX ADMIN — HEPARIN SODIUM 5000 UNIT(S): 5000 INJECTION INTRAVENOUS; SUBCUTANEOUS at 06:14

## 2019-12-23 RX ADMIN — HEPARIN SODIUM 5000 UNIT(S): 5000 INJECTION INTRAVENOUS; SUBCUTANEOUS at 21:50

## 2019-12-23 NOTE — OCCUPATIONAL THERAPY INITIAL EVALUATION ADULT - PERTINENT HX OF CURRENT PROBLEM, REHAB EVAL
01/14/19 0900   Group 1   Start Time 0830   Stop Time 0915   Length (min) 45 Min   Group Name Check In   Focus of Group Symptoms and Goals   Attendance Not present   Milagros Lino, BRAYANW, SAC   Pt admitted 12/21/19 following episode of dizziness/ room spinning sensation while eating dinner at work. Following admission and initial testing, pt found to have acute left cerebellar CVA.

## 2019-12-23 NOTE — DISCHARGE NOTE PROVIDER - PROVIDER TOKENS
PROVIDER:[TOKEN:[[id number]],FOLLOWUP:[2 weeks]],PROVIDER:[TOKEN:[[id number]],FOLLOWUP:[2 weeks]],FREE:[LAST:[Dr. Marly Castillo at [facility]],PHONE:[(   )    -],FAX:[(   )    -],FOLLOWUP:[1-3 days]]

## 2019-12-23 NOTE — PROGRESS NOTE ADULT - SUBJECTIVE AND OBJECTIVE BOX
JENNI PETERS 53y Male  MRN#: [id number]   CODE STATUS: FULL      SUBJECTIVE  Patient is a 53y old Male who presents with a chief complaint of vertigo and gait ataxia (22 Dec 2019 20:22)    Currently admitted to medicine with the primary diagnosis of acute infarct in the medial left cerebellar hemisphere/vermis.    Today is hospital day 1d,   OVERNIGHT EVENTS: Today he feels much better . No nausea or room spinning sensation but slightly feels wobbly when ambulating. He did walk to the bathroom.    This morning he is laying in bed comfortably .     Urinating and stooling appropriately.    Present Today:           Bernard Catheter (x)No/ ()Yes?   Indication:             Central Line (x)No/ ()Yes?   Indication:          IV Fluids (x)No/ ()Yes? Type:  Rate:  Indication:    OBJECTIVE  PAST MEDICAL & SURGICAL HISTORY  No pertinent past medical history  No significant past surgical history    ALLERGIES:  No Known Allergies    HOME MEDICATIONS:  Home Medications:    MEDICATIONS:  STANDING MEDICATIONS  aspirin enteric coated 81 milliGRAM(s) Oral daily  atorvastatin 80 milliGRAM(s) Oral at bedtime  heparin  Injectable 5000 Unit(s) SubCutaneous every 8 hours  influenza   Vaccine 0.5 milliLiter(s) IntraMuscular once  pantoprazole    Tablet 40 milliGRAM(s) Oral before breakfast  polyethylene glycol 3350 17 Gram(s) Oral daily  senna 1 Tablet(s) Oral daily  sodium chloride 0.9%. 1000 milliLiter(s) (50 mL/Hr) IV Continuous <Continuous>    PRN MEDICATIONS  meclizine 25 milliGRAM(s) Oral every 6 hours PRN      VITAL SIGNS: Last 24 Hours  T(C): 36.7 (23 Dec 2019 05:38), Max: 36.9 (22 Dec 2019 22:14)  T(F): 98.1 (23 Dec 2019 05:38), Max: 98.4 (22 Dec 2019 22:14)  HR: 58 (23 Dec 2019 05:38) (55 - 73)  BP: 136/65 (23 Dec 2019 05:38) (94/51 - 141/75)  RR: 19 (23 Dec 2019 05:38) (18 - 19)  SpO2: 97% (22 Dec 2019 11:41) (97% - 97%)    LABS:                        13.2   9.85  )-----------( 280      ( 23 Dec 2019 05:11 )             42.0     12-23    143  |  104  |  11  ----------------------------<  93  3.9   |  24  |  1.1    Ca    8.7      23 Dec 2019 05:11  Mg     2.1         TPro  6.4  /  Alb  3.8  /  TBili  0.5  /  DBili  x   /  AST  14  /  ALT  20  /  AlkPhos  87        Urinalysis Basic - ( 22 Dec 2019 18:28 )    Color: Light Yellow / Appearance: Clear / S.013 / pH: x  Gluc: x / Ketone: Negative  / Bili: Negative / Urobili: <2 mg/dL   Blood: x / Protein: Negative / Nitrite: Negative   Leuk Esterase: Negative / RBC: x / WBC x   Sq Epi: x / Non Sq Epi: x / Bacteria: x    CARDIAC MARKERS ( 21 Dec 2019 21:05 )  x     / <0.01 ng/mL / x     / x     / x            RADIOLOGY:  MR Head No Cont (19 @ 09:49)   1.  Punctate acute infarct in the medial left cerebellar hemisphere/vermis.    2.  Multiple foci of susceptibility effect within bilateral cerebellar hemispheres. These can reflect chronic microhemorrhages seen in the setting of hypertension.    3.  Mild chronic microvascular changes. Chronic lacunar infarct within the left anterior frontal white matter.       CT Angio Neck w/ IV Cont (19 @ 05:12) >  No evidence of major vascular stenosis or occlusion.    ECHO:  pending    PHYSICAL EXAM:    GENERAL: NAD, well-developed, AAOx3  HEENT:  Atraumatic, Normocephalic. EOMI, PERRLA, conjunctiva and sclera clear, No JVD  PULMONARY: Clear to auscultation bilaterally; No wheeze  CARDIOVASCULAR: Regular rate and rhythm; No murmurs, rubs, or gallops  GASTROINTESTINAL: Soft, Nontender, Nondistended; Bowel sounds present  MUSCULOSKELETAL:  2+ Peripheral Pulses, No clubbing, cyanosis, or edema  NEUROLOGY: non-focal, strength 5/5 UE & LE  SKIN: No rashes or lesions    ASSESSMENT & PLAN  53 year old gentleman with no significant PMH presents to the ED with acute onset room spinning dizziness. Patient developed dizziness at home with associated nausea and vomiting. Compazine and Zofran effective but gait ataxia persists. CTH failed to reveal acute intracranial pathology. CTA head and neck were unremarkable, MRI brain revealed a cerebellar ischemic stroke. Etiology of stroke unknown, will have a better understanding post stroke workup    #Vertigo and ataxia secondary to acute ischemic infarct in the medial left cerebellar hemisphere/vermis.  - NIHSS : 0 on admission  - MRI showed Punctate acute infarct in the medial left cerebellar hemisphere/vermis.  - telemetry : no events  - c/w aspirin 81 mg, Atorvastatin 80mg   - fu echo with bubble and hypercoagulable -pending  - neuro checks Q4H  - lipid panel,: T  LDL:108, CHol:154  HDl: 28 HBA1c -pending  - Fall risk precautions  -  neurology following  - will keep meclizine as has dizziness on position change   - avoid hypotension keep bp > 120 mmHg     #elevated WBC with left shift-resolved  18>>9 resolved  no signs of infection   will monitor for fever   keep off antibiotics for now    CXR normal and UA: normal  ordered and bld cx -pending    GI ppx: Po protonix 40 mg QD  DVT ppx: Heparin SC   Activity :  ambulate with assistance,, Physical therapy, rehab   Diet : DASH/TLC  Dispo: Home   Full Code   Pending: ECHO JENNI PETERS 53y Male  MRN#: [id number]   CODE STATUS: FULL      SUBJECTIVE  Patient is a 53y old Male who presents with a chief complaint of vertigo and gait ataxia (22 Dec 2019 20:22)    Currently admitted to medicine with the primary diagnosis of acute infarct in the medial left cerebellar hemisphere/vermis.    Today is hospital day 1d,   OVERNIGHT EVENTS: Today he feels much better . No nausea or room spinning sensation but slightly feels wobbly when ambulating. He did walk to the bathroom.    This morning he is laying in bed comfortably .     Urinating and stooling appropriately.    Present Today:           Bernard Catheter (x)No/ ()Yes?   Indication:             Central Line (x)No/ ()Yes?   Indication:          IV Fluids (x)No/ ()Yes? Type:  Rate:  Indication:    OBJECTIVE  PAST MEDICAL & SURGICAL HISTORY  No pertinent past medical history  No significant past surgical history    ALLERGIES:  No Known Allergies    HOME MEDICATIONS:  Home Medications:    MEDICATIONS:  STANDING MEDICATIONS  aspirin enteric coated 81 milliGRAM(s) Oral daily  atorvastatin 80 milliGRAM(s) Oral at bedtime  heparin  Injectable 5000 Unit(s) SubCutaneous every 8 hours  influenza   Vaccine 0.5 milliLiter(s) IntraMuscular once  pantoprazole    Tablet 40 milliGRAM(s) Oral before breakfast  polyethylene glycol 3350 17 Gram(s) Oral daily  senna 1 Tablet(s) Oral daily  sodium chloride 0.9%. 1000 milliLiter(s) (50 mL/Hr) IV Continuous <Continuous>    PRN MEDICATIONS  meclizine 25 milliGRAM(s) Oral every 6 hours PRN      VITAL SIGNS: Last 24 Hours  T(C): 36.7 (23 Dec 2019 05:38), Max: 36.9 (22 Dec 2019 22:14)  T(F): 98.1 (23 Dec 2019 05:38), Max: 98.4 (22 Dec 2019 22:14)  HR: 58 (23 Dec 2019 05:38) (55 - 73)  BP: 136/65 (23 Dec 2019 05:38) (94/51 - 141/75)  RR: 19 (23 Dec 2019 05:38) (18 - 19)  SpO2: 97% (22 Dec 2019 11:41) (97% - 97%)    LABS:                        13.2   9.85  )-----------( 280      ( 23 Dec 2019 05:11 )             42.0     12-23    143  |  104  |  11  ----------------------------<  93  3.9   |  24  |  1.1    Ca    8.7      23 Dec 2019 05:11  Mg     2.1         TPro  6.4  /  Alb  3.8  /  TBili  0.5  /  DBili  x   /  AST  14  /  ALT  20  /  AlkPhos  87        Urinalysis Basic - ( 22 Dec 2019 18:28 )    Color: Light Yellow / Appearance: Clear / S.013 / pH: x  Gluc: x / Ketone: Negative  / Bili: Negative / Urobili: <2 mg/dL   Blood: x / Protein: Negative / Nitrite: Negative   Leuk Esterase: Negative / RBC: x / WBC x   Sq Epi: x / Non Sq Epi: x / Bacteria: x    CARDIAC MARKERS ( 21 Dec 2019 21:05 )  x     / <0.01 ng/mL / x     / x     / x            RADIOLOGY:  MR Head No Cont (19 @ 09:49)   1.  Punctate acute infarct in the medial left cerebellar hemisphere/vermis.    2.  Multiple foci of susceptibility effect within bilateral cerebellar hemispheres. These can reflect chronic microhemorrhages seen in the setting of hypertension.    3.  Mild chronic microvascular changes. Chronic lacunar infarct within the left anterior frontal white matter.       CT Angio Neck w/ IV Cont (19 @ 05:12) >  No evidence of major vascular stenosis or occlusion.    ECHO:  pending    PHYSICAL EXAM:    GENERAL: NAD, well-developed, AAOx3  HEENT:  Atraumatic, Normocephalic. EOMI, PERRLA, conjunctiva and sclera clear, No JVD  PULMONARY: Clear to auscultation bilaterally; No wheeze  CARDIOVASCULAR: Regular rate and rhythm; No murmurs, rubs, or gallops  GASTROINTESTINAL: Soft, Nontender, Nondistended; Bowel sounds present  MUSCULOSKELETAL:  2+ Peripheral Pulses, No clubbing, cyanosis, or edema  NEUROLOGY: non-focal, strength 5/5 UE & LE  SKIN: No rashes or lesions    ASSESSMENT & PLAN  53 year old gentleman with no significant PMH presents to the ED with acute onset room spinning dizziness. Patient developed dizziness at home with associated nausea and vomiting. Compazine and Zofran effective but gait ataxia persists. CTH failed to reveal acute intracranial pathology. CTA head and neck were unremarkable, MRI brain revealed a cerebellar ischemic stroke. Etiology of stroke unknown, will have a better understanding post stroke workup    #Vertigo and ataxia secondary to acute ischemic infarct in the medial left cerebellar hemisphere/vermis.  - NIHSS : 0 on admission  - MRI showed Punctate acute infarct in the medial left cerebellar hemisphere/vermis.  - telemetry : no events  - c/w aspirin 81 mg, Atorvastatin 80mg   -PAOLA today and hypercoagulable -pending  - neuro checks Q4H  - lipid panel,: T  LDL:108, CHol:154  HDl: 28 HBA1c -pending  - Fall risk precautions  -  neurology following  - will keep meclizine as has dizziness on position change   - avoid hypotension keep bp > 120 mmHg     #elevated WBC with left shift-resolved  18>>9 resolved  no signs of infection   will monitor for fever   keep off antibiotics for now    CXR normal and UA: normal  ordered and bld cx -pending    GI ppx: Po protonix 40 mg QD  DVT ppx: Heparin SC   Activity :  ambulate with assistance,, Physical therapy, rehab   Diet : DASH/TLC  Dispo: Home   Full Code   Pending: PAOLA, anticipate discharge in 24 hours

## 2019-12-23 NOTE — PRE-ANESTHESIA EVALUATION ADULT - NSANTHOSAYNRD_GEN_A_CORE
No. JUDIE screening performed.  STOP BANG Legend: 0-2 = LOW Risk; 3-4 = INTERMEDIATE Risk; 5-8 = HIGH Risk

## 2019-12-23 NOTE — DISCHARGE NOTE PROVIDER - NSDCCPCAREPLAN_GEN_ALL_CORE_FT
PRINCIPAL DISCHARGE DIAGNOSIS  Diagnosis: Cerebellar stroke, acute  Assessment and Plan of Treatment: You came in symptoms of cerebellar stroke. CT head , CT angio head and neck were negative initially but MRI brain showed stroke of left cerebellum. PAOLA was done to rule out any akinesis. Hypercoagulable panel was sent. You were started on aspirin and statin. FOllow up with Dr. León within 2 weeks. PRINCIPAL DISCHARGE DIAGNOSIS  Diagnosis: Cerebellar stroke, acute  Assessment and Plan of Treatment: You came in symptoms of cerebellar stroke. CT head , CT angio head and neck were negative initially but MRI brain showed stroke of left cerebellum. PAOLA was done to rule out any akinesis. Hypercoagulable panel was sent. You were started on aspirin and statin. FOllow up with Dr. León within 2 weeks. You will also follow up Dr. Tapia for outpatient event event monitor      SECONDARY DISCHARGE DIAGNOSES  Diagnosis: Morbid obesity  Assessment and Plan of Treatment: You have to work on weight loss and dietary modifications. Incorporate moderate exercise for 30minutes daily. Due to increased neck cirmcuference, you might be having sleep apnea. Follow up with pulmonologist for polysomnography studies. PRINCIPAL DISCHARGE DIAGNOSIS  Diagnosis: Cerebellar stroke, acute  Assessment and Plan of Treatment: You came in symptoms of cerebellar stroke. CT head , CT angio head and neck were negative initially but MRI brain showed stroke of left cerebellum. PAOLA was done to rule out any akinesis. Hypercoagulable panel was sent. You were started on aspirin and statin. FOllow up with Dr. León within 1-2 weeks. You will also follow up Dr. Bishop for monitoring of results of event monitor      SECONDARY DISCHARGE DIAGNOSES  Diagnosis: Morbid obesity  Assessment and Plan of Treatment: You have to work on weight loss and dietary modifications. Incorporate moderate exercise for 30minutes daily. Due to increased neck cirmcuference, you might be having sleep apnea. Follow up with pulmonologist for polysomnography studies.

## 2019-12-23 NOTE — DISCHARGE NOTE PROVIDER - HOSPITAL COURSE
53 year old gentleman with no significant PMH presents to the ED with acute onset room spinning dizziness. Patient developed dizziness at 1800 with associated nausea and vomiting. Compazine and Zofran effective but gait ataxia persists.     patient reported he just started eating his dinner and suddenly felt warm and sweaty. He then started feeling dizzy and unable to walk so he called his friend at work who called EMS. Patient then started multiple NB/NB vomiting. denies HA/chest pain/abd pain/diarrhea/urinary sxs/ ear pain and tinnitus. denies hx of vertigo.    patient never took aspirin over the counter. vitals on admission were as: 133/65 mmHg- 68 beats/min - 98% room air- temp 98.8 F. CT angio head and neck and CT head were negative . MRI showed Punctate acute infarct in the medial left cerebellar hemisphere/vermis. Admitted to stroke unit . He was started on aspirin and statin. PAOLA was done on 2019 and hypercoagulable -pending.  lipid panel,: T  LDL:108, CHol:154  HDl: 28 HBA1c -pending 53 year old gentleman with no significant PMH presents to the ED with acute onset room spinning dizziness. Patient developed dizziness at 1800 with associated nausea and vomiting. Compazine and Zofran effective but gait ataxia persists.Patient reported he just started eating his dinner and suddenly felt warm and sweaty. He then started feeling dizzy and unable to walk so he called his friend at work who called EMS. Patient then started multiple NB/NB vomiting. denies HA/chest pain/abd pain/diarrhea/urinary sxs/ ear pain and tinnitus. denies hx of vertigo.    patient never took aspirin over the counter. vitals on admission were as: 133/65 mmHg- 68 beats/min - 98% room air- temp 98.8 F. CT angio head and neck and CT head were negative . MRI showed Punctate acute infarct in the medial left cerebellar hemisphere/vermis. Admitted to stroke unit . He was started on aspirin and statin. PAOLA was done on 2019 and hypercoagulable -pending.  lipid panel,: T  LDL:108, CHol:154  HDl: 28. He had his PAOLA on 2019 , it was clean. Physiatry has seen him and he worked with PT. His gait is much stable now. He had episodes of sinus bradycardia overnight likely secondary to JUDIE. He will have to follow up with pulm for outpatient PSG studies. He will follow up Dr. León within 2 weeks. He will follow up with Dr. Tapia within 1 week for an outpatient event monitor.        Medical Reconciliation has been reviewed with Medical Attending. All consulted cleared for discharge. Discharge instructions discussed and patient aware when to seek medical attention. Patient has proper follow up. All resulted including diagnosis and patient aware they require further follow up. Stressed importance of proper follow up. Medications sent to the pharmacy , checked insurance coverage and changes discussed. All questions and concerns from patient and family addressed. Understanding of instructions verbalized.

## 2019-12-23 NOTE — DISCHARGE NOTE PROVIDER - CARE PROVIDERS DIRECT ADDRESSES
,[email or web address]

## 2019-12-23 NOTE — DISCHARGE NOTE PROVIDER - CARE PROVIDER_API CALL
Narendra León)  EEGEpilepsy; Neurology  [address], [address]  [place] [address]  Phone: [phone]  Fax: [phone]  Follow Up Time: 2 weeks    Alberta Beck)  Cardiology; Internal Medicine  [address], [address]  [place] [address]  Phone: [phone]  Fax: [phone]  Follow Up Time: 2 weeks    Dr. Marly Castillo at [facility],   Phone: (   )    -  Fax: (   )    -  Follow Up Time: 1-3 days

## 2019-12-23 NOTE — DISCHARGE NOTE PROVIDER - NSDCMRMEDTOKEN_GEN_ALL_CORE_FT
aspirin 81 mg oral delayed release tablet: 1 tab(s) orally once a day  atorvastatin 80 mg oral tablet: 1 tab(s) orally once a day (at bedtime)  meclizine 25 mg oral tablet: 1 tab(s) orally every 6 hours, As needed, Dizziness

## 2019-12-24 ENCOUNTER — TRANSCRIPTION ENCOUNTER (OUTPATIENT)
Age: 54
End: 2019-12-24

## 2019-12-24 VITALS — WEIGHT: 214.95 LBS | HEIGHT: 65 IN

## 2019-12-24 LAB
ANION GAP SERPL CALC-SCNC: 14 MMOL/L — SIGNIFICANT CHANGE UP (ref 7–14)
APCR PPP: 2.76 RATIO — SIGNIFICANT CHANGE UP
B2 GLYCOPROT1 AB SER QL: NEGATIVE — SIGNIFICANT CHANGE UP
BUN SERPL-MCNC: 13 MG/DL — SIGNIFICANT CHANGE UP (ref 10–20)
CALCIUM SERPL-MCNC: 8.7 MG/DL — SIGNIFICANT CHANGE UP (ref 8.5–10.1)
CARDIOLIPIN AB SER-ACNC: NEGATIVE — SIGNIFICANT CHANGE UP
CHLORIDE SERPL-SCNC: 101 MMOL/L — SIGNIFICANT CHANGE UP (ref 98–110)
CO2 SERPL-SCNC: 24 MMOL/L — SIGNIFICANT CHANGE UP (ref 17–32)
CREAT SERPL-MCNC: 1 MG/DL — SIGNIFICANT CHANGE UP (ref 0.7–1.5)
DRVVT SCREEN TO CONFIRM RATIO: SIGNIFICANT CHANGE UP
ESTIMATED AVERAGE GLUCOSE: 111 MG/DL — SIGNIFICANT CHANGE UP (ref 68–114)
GLUCOSE SERPL-MCNC: 87 MG/DL — SIGNIFICANT CHANGE UP (ref 70–99)
HBA1C BLD-MCNC: 5.5 % — SIGNIFICANT CHANGE UP (ref 4–5.6)
HCT VFR BLD CALC: 42 % — SIGNIFICANT CHANGE UP (ref 42–52)
HCYS SERPL-MCNC: 12.2 UMOL/L — SIGNIFICANT CHANGE UP
HGB BLD-MCNC: 13.7 G/DL — LOW (ref 14–18)
LA NT DPL PPP QL: SIGNIFICANT CHANGE UP
MAGNESIUM SERPL-MCNC: 2.1 MG/DL — SIGNIFICANT CHANGE UP (ref 1.8–2.4)
MCHC RBC-ENTMCNC: 27.1 PG — SIGNIFICANT CHANGE UP (ref 27–31)
MCHC RBC-ENTMCNC: 32.6 G/DL — SIGNIFICANT CHANGE UP (ref 32–37)
MCV RBC AUTO: 83 FL — SIGNIFICANT CHANGE UP (ref 80–94)
NORMALIZED SCT PPP-RTO: 1.1 RATIO — SIGNIFICANT CHANGE UP (ref 0–1.16)
NORMALIZED SCT PPP-RTO: SIGNIFICANT CHANGE UP
NRBC # BLD: 0 /100 WBCS — SIGNIFICANT CHANGE UP (ref 0–0)
PLATELET # BLD AUTO: 269 K/UL — SIGNIFICANT CHANGE UP (ref 130–400)
POTASSIUM SERPL-MCNC: 3.9 MMOL/L — SIGNIFICANT CHANGE UP (ref 3.5–5)
POTASSIUM SERPL-SCNC: 3.9 MMOL/L — SIGNIFICANT CHANGE UP (ref 3.5–5)
PROT C ACT/NOR PPP: 97 % — SIGNIFICANT CHANGE UP (ref 65–129)
PROT C ACT/NOR PPP: 98 % — SIGNIFICANT CHANGE UP (ref 65–129)
RBC # BLD: 5.06 M/UL — SIGNIFICANT CHANGE UP (ref 4.7–6.1)
RBC # FLD: 13.4 % — SIGNIFICANT CHANGE UP (ref 11.5–14.5)
SODIUM SERPL-SCNC: 139 MMOL/L — SIGNIFICANT CHANGE UP (ref 135–146)
TSH SERPL-MCNC: 2.91 UIU/ML — SIGNIFICANT CHANGE UP (ref 0.27–4.2)
WBC # BLD: 9.95 K/UL — SIGNIFICANT CHANGE UP (ref 4.8–10.8)
WBC # FLD AUTO: 9.95 K/UL — SIGNIFICANT CHANGE UP (ref 4.8–10.8)

## 2019-12-24 PROCEDURE — 99232 SBSQ HOSP IP/OBS MODERATE 35: CPT

## 2019-12-24 PROCEDURE — 99239 HOSP IP/OBS DSCHRG MGMT >30: CPT

## 2019-12-24 PROCEDURE — 99222 1ST HOSP IP/OBS MODERATE 55: CPT

## 2019-12-24 RX ORDER — ASPIRIN/CALCIUM CARB/MAGNESIUM 324 MG
1 TABLET ORAL
Qty: 30 | Refills: 0
Start: 2019-12-24 | End: 2020-01-22

## 2019-12-24 RX ORDER — ATORVASTATIN CALCIUM 80 MG/1
1 TABLET, FILM COATED ORAL
Qty: 0 | Refills: 0 | DISCHARGE
Start: 2019-12-24

## 2019-12-24 RX ORDER — ATORVASTATIN CALCIUM 80 MG/1
1 TABLET, FILM COATED ORAL
Qty: 30 | Refills: 0
Start: 2019-12-24 | End: 2020-01-22

## 2019-12-24 RX ORDER — MECLIZINE HCL 12.5 MG
1 TABLET ORAL
Qty: 40 | Refills: 0
Start: 2019-12-24 | End: 2020-01-02

## 2019-12-24 RX ORDER — MECLIZINE HCL 12.5 MG
1 TABLET ORAL
Qty: 0 | Refills: 0 | DISCHARGE
Start: 2019-12-24

## 2019-12-24 RX ORDER — ASPIRIN/CALCIUM CARB/MAGNESIUM 324 MG
1 TABLET ORAL
Qty: 0 | Refills: 0 | DISCHARGE
Start: 2019-12-24

## 2019-12-24 RX ADMIN — Medication 81 MILLIGRAM(S): at 11:10

## 2019-12-24 RX ADMIN — PANTOPRAZOLE SODIUM 40 MILLIGRAM(S): 20 TABLET, DELAYED RELEASE ORAL at 06:06

## 2019-12-24 RX ADMIN — POLYETHYLENE GLYCOL 3350 17 GRAM(S): 17 POWDER, FOR SOLUTION ORAL at 11:10

## 2019-12-24 RX ADMIN — HEPARIN SODIUM 5000 UNIT(S): 5000 INJECTION INTRAVENOUS; SUBCUTANEOUS at 06:06

## 2019-12-24 RX ADMIN — SENNA PLUS 1 TABLET(S): 8.6 TABLET ORAL at 11:10

## 2019-12-24 NOTE — SWALLOW BEDSIDE ASSESSMENT ADULT - SWALLOW EVAL: DIAGNOSIS
No s/s aspiration/penetration for thin liquids and regular consistency
Pt currently NPO for PAOLA however pt consumed breakfast this AM, reports +toleration with no difficulty swallowing. Pt also states S+L is baseline, reports no difficulties

## 2019-12-24 NOTE — CONSULT NOTE ADULT - ASSESSMENT
IMPRESSION: Rehab of cva    PRECAUTIONS: [  ] Cardiac  [  ] Respiratory  [  ] Seizures [  ] Contact Isolation  [  ] Droplet Isolation  [  ] Other    Weight Bearing Status:     RECOMMENDATION:    Out of Bed to Chair     DVT/Decubiti Prophylaxis    REHAB PLAN:     [ x  ] Bedside P/T 3-5 times a week   [ x  ]   Bedside O/T  2-3 times a week             [   ] No Rehab Therapy Indicated                   [   ]  Speech Therapy   Conditioning/ROM                                    ADL  Bed Mobility                                               Conditioning/ROM  Transfers                                                     Bed Mobility  Sitting /Standing Balance                         Transfers                                        Gait Training                                               Sitting/Standing Balance  Stair Training [   ]Applicable                    Home equipment Eval                                                                        Splinting  [   ] Only      GOALS:   ADL   [ x  ]   Independent                    Transfers  [  x ] Independent                          Ambulation  [ x  ] Independent     [  x  ] With device                            [   ]  CG                                                         [   ]  CG                                                                  [   ] CG                            [    ] Min A                                                   [   ] Min A                                                              [   ] Min  A          DISCHARGE PLAN:   [   ]  Good candidate for Intensive Rehabilitation/Hospital based                                             Will tolerate 3hrs Intensive Rehab Daily                                       [    ]  Short Term Rehab in Skilled Nursing Facility                                       [ x   ]  Home with Outpatient or VN services                                         [    ]  Possible Candidate for Intensive Hospital based Rehab
Impression:  53 year old gentleman with no significant PMH presents to the ED with acute onset room spinning dizziness. Patient developed dizziness at 1800 with associated nausea and vomiting. Compazine and Zofran effective but gait ataxia persists. CTH failed to reveal acute intracranial pathology.     Suggestion:  CTA head and neck.   MRI brain without brian.   Meclizine for dizziness.   Maintain hydration.    Marquise Borges NP  [phone]
This is a 54 y/o male with no significant PMH who presents with CVA.     Impression:  R/o arrythmia as cause for cryptogenic CVA    Plan:  - Event monitor on discharge for 30 days  - If negative findings on event, will place loop recorder  - F/U as outpatient in 6 weeks  - Cont current management

## 2019-12-24 NOTE — PROGRESS NOTE ADULT - SUBJECTIVE AND OBJECTIVE BOX
JENNI PETERS    Chief Complaint: Dizziness    HPI:  53 year old gentleman with no significant PMH presents to the ED with acute onset room spinning dizziness. Patient developed dizziness at 1800 with associated nausea and vomiting. Compazine and Zofran effective but gait ataxia persists. CT angio head and neck and CT head were negative. MRI positive for acute cerebellar ischemic stroke.       Relevant PMH:  [] Prior ischemic stroke/TIA  [] Afib  []CAD  []HTN  []DLD  []DM []PVD []Obesity [] Sedintary lifestyle []CHF  []JUDIE  []Cancer Hx     Social History: [] Smoking []  Drug Use: []   Alcohol Use:   [] Other:      Possible Location of Stroke:  Cerebellar stroke.  Possible Cause of Stroke:  Unknown at this time, will have a better understanding post stroke workup.  Relevant Cerebral Imaging:  < from: MR Head No Cont (12.22.19 @ 09:49) >  IMPRESSION:     1.  Punctate acute infarct in the medial left cerebellar hemisphere/vermis.    2.  Multiple foci of susceptibility effect within bilateral cerebellar hemispheres. These can reflect chronic microhemorrhages seen in the setting of hypertension.    3.  Mild chronic microvascular changes. Chronic lacunar infarct within the left anterior frontal white matter.      Relevant Cervicocerebral Imaging:  CT Angio Neck w/ IV Cont:   EXAM:  CT ANGIO NECK (W)AW IC        EXAM:  CT ANGIO BRAIN (W)AW IC        IMPRESSION:     No evidence of major vascular stenosis or occlusion.      Relevant blood tests:  Direct LDL: 108        Relevant cardiac rhythm monitoring:  no reported events.   Relevant Cardiac Structure:(TTE/PAOLA +/-):[]No intracardiac thrombus/[] no vegetation/[]no akynesia/EF:  pending      Home Medications:      MEDICATIONS  (STANDING):  aspirin enteric coated 81 milliGRAM(s) Oral daily  atorvastatin 80 milliGRAM(s) Oral at bedtime  heparin  Injectable 5000 Unit(s) SubCutaneous every 8 hours  influenza   Vaccine 0.5 milliLiter(s) IntraMuscular once  pantoprazole    Tablet 40 milliGRAM(s) Oral before breakfast  polyethylene glycol 3350 17 Gram(s) Oral daily  senna 1 Tablet(s) Oral daily  sodium chloride 0.9%. 1000 milliLiter(s) (50 mL/Hr) IV Continuous <Continuous>      PT/OT/Speech/Rehab/S&Swr: pending    Exam:    Vital Signs Last 24 Hrs  T(C): 36.4 (24 Dec 2019 05:32), Max: 36.4 (23 Dec 2019 19:02)  T(F): 97.5 (24 Dec 2019 05:32), Max: 97.6 (23 Dec 2019 19:02)  HR: 60 (24 Dec 2019 05:32) (56 - 64)  BP: 133/73 (24 Dec 2019 05:32) (111/56 - 151/67)  BP(mean): --  RR: 19 (24 Dec 2019 05:32) (17 - 19)  SpO2: 98% (23 Dec 2019 20:16) (95% - 98%)    NIHSS      LOC:       1a: 0    1b(Questions):   0        1c(Instructions):        0     Best Gaze:0  Visual:0  Motor:                 RUE:  0   RLE: 0    LUE:  0   LLE:     0FACE:   0  Limb Ataxia:0  Sensory:     00  Language:     0  Dysarthria:        0  Extinction and Inattention:0    NIHSS on admission:     0     NIHSS yesterday:0          NIHSS today:0             m-RS:0

## 2019-12-24 NOTE — SWALLOW BEDSIDE ASSESSMENT ADULT - SLP PERTINENT HISTORY OF CURRENT PROBLEM
Pt admitted with vertigo, gait ataxia. Acute infarct in medial L cerebellar hemisphere/vermis
Pt admitted with vertigo, gait ataxia. Acute infarct in medial L cerebellar hemisphere/vermis

## 2019-12-24 NOTE — DISCHARGE NOTE NURSING/CASE MANAGEMENT/SOCIAL WORK - NSDCPEPTSTRK_GEN_ALL_CORE
Stroke support groups for patients, families, and friends/Prescribed medications/Need for follow up after discharge/Stroke education booklet/Risk factors for stroke/Stroke warning signs and symptoms/Signs and symptoms of stroke/Call 911 for stroke

## 2019-12-24 NOTE — PROGRESS NOTE ADULT - REASON FOR ADMISSION
vertigo and gait ataxia

## 2019-12-24 NOTE — CONSULT NOTE ADULT - SUBJECTIVE AND OBJECTIVE BOX
HPI:  53 year old gentleman with no significant PMH presents to the ED with acute onset room spinning dizziness. Patient developed dizziness at 1800 with associated nausea and vomiting. Compazine and Zofran effective but gait ataxia persists.   patient reported he just started eating his dinner and suddenly felt warm and sweaty. He then started feeling dizzy and unable to walk so he called his friend at work who called EMS. Patient then started multiple NB/NB vomiting. denies HA/chest pain/abd pain/diarrhea/urinary sxs/ ear pain and tinnitus. denies hx of vertigo.  patient never took aspirin over the counter     vitals on admission were as: 133/65 mmHg- 68 beats/min - 98% room air- temp 98.8 F    CT angio head and neck and CT head were negative   MRI positive for acute cerebellar ischemia   admitted to stroke unit (22 Dec 2019 12:51)    EP consulted to r/o cryptogenic cause of CVA.        PAST MEDICAL & SURGICAL HISTORY:  No pertinent past medical history  No significant past surgical history      ECHO   FINDINGS:     STRESS< from: PAOLA w/Probe Placement (19 @ 14:51) >  Summary:   1. Left ventricular ejection fraction, by visual estimation, is 60 to 65%.   2. Normal global left ventricular systolic function.   3. No evidence of patent foramen ovale.   4. No left atrial appendage thrombus.    < end of copied text >    MEDICATIONS  (STANDING):  aspirin enteric coated 81 milliGRAM(s) Oral daily  atorvastatin 80 milliGRAM(s) Oral at bedtime  heparin  Injectable 5000 Unit(s) SubCutaneous every 8 hours  influenza   Vaccine 0.5 milliLiter(s) IntraMuscular once  pantoprazole    Tablet 40 milliGRAM(s) Oral before breakfast  polyethylene glycol 3350 17 Gram(s) Oral daily  senna 1 Tablet(s) Oral daily  sodium chloride 0.9%. 1000 milliLiter(s) (50 mL/Hr) IV Continuous <Continuous>    MEDICATIONS  (PRN):  meclizine 25 milliGRAM(s) Oral every 6 hours PRN Dizziness   Home Medications:  aspirin 81 mg oral delayed release tablet: 1 tab(s) orally once a day (24 Dec 2019 11:36)  atorvastatin 80 mg oral tablet: 1 tab(s) orally once a day (at bedtime) (24 Dec 2019 11:36)  meclizine 25 mg oral tablet: 1 tab(s) orally every 6 hours, As needed, Dizziness (24 Dec 2019 11:36)      FAMILY HISTORY:  Family history of cerebrovascular accident (CVA) in father: in his 40s  Family history of coronary artery disease (Father): father with CVA in his 40s      Vital Signs Last 24 Hrs  T(C): 36.4 (24 Dec 2019 10:27), Max: 36.4 (23 Dec 2019 19:02)  T(F): 97.5 (24 Dec 2019 10:27), Max: 97.6 (23 Dec 2019 19:02)  HR: 87 (24 Dec 2019 10:27) (56 - 87)  BP: 122/61 (24 Dec 2019 10:27) (111/56 - 151/67)  BP(mean): --  RR: 20 (24 Dec 2019 10:27) (17 - 20)  SpO2: 98% (23 Dec 2019 20:16) (97% - 98%)      INTERPRETATION OF TELEMETRY: SR     ECG:  < from: 12 Lead ECG (19 @ 21:28) >  Ventricular Rate 70 BPM    Atrial Rate 70 BPM    P-R Interval 186 ms    QRS Duration 94 ms    Q-T Interval 460 ms    QTC Calculation(Bezet) 496 ms    P Axis 48 degrees    R Axis -24 degrees    T Axis 2 degrees    Diagnosis Line Normal sinus rhythm  Incomplete right bundle branch block  Nonspecific T wave abnormality  Prolonged QT  Abnormal ECG    < end of copied text >        LABS:                        13.7   9.95  )-----------( 269      ( 24 Dec 2019 05:55 )             42.0         139  |  101  |  13  ----------------------------<  87  3.9   |  24  |  1.0    Ca    8.7      24 Dec 2019 05:55  Mg     2.1         TPro  6.4  /  Alb  3.8  /  TBili  0.5  /  DBili  x   /  AST  14  /  ALT  20  /  AlkPhos  87        LIVER FUNCTIONS - ( 23 Dec 2019 05:11 )  Alb: 3.8 g/dL / Pro: 6.4 g/dL / ALK PHOS: 87 U/L / ALT: 20 U/L / AST: 14 U/L / GGT: x             Urinalysis Basic - ( 22 Dec 2019 18:28 )    Color: Light Yellow / Appearance: Clear / S.013 / pH: x  Gluc: x / Ketone: Negative  / Bili: Negative / Urobili: <2 mg/dL   Blood: x / Protein: Negative / Nitrite: Negative   Leuk Esterase: Negative / RBC: x / WBC x   Sq Epi: x / Non Sq Epi: x / Bacteria: x      BNP  I&O's Detail    23 Dec 2019 07:01  -  24 Dec 2019 07:00  --------------------------------------------------------  IN:    sodium chloride 0.9%.: 600 mL  Total IN: 600 mL    OUT:    Voided: 650 mL  Total OUT: 650 mL    Total NET: -50 mL    Daily Height in cm: 165.1 (23 Dec 2019 15:14)    Daily     RADIOLOGY & ADDITIONAL STUDIES:

## 2019-12-24 NOTE — CONSULT NOTE ADULT - SUBJECTIVE AND OBJECTIVE BOX
HPI:  53 year old gentleman with no significant PMH presents to the ED with acute onset room spinning dizziness. Patient developed dizziness at 1800 with associated nausea and vomiting. Compazine and Zofran effective but gait ataxia persists.   patient reported he just started eating his dinner and suddenly felt warm and sweaty. He then started feeling dizzy and unable to walk so he called his friend at work who called EMS. Patient then started multiple NB/NB vomiting. denies HA/chest pain/abd pain/diarrhea/urinary sxs/ ear pain and tinnitus. denies hx of vertigo.  patient never took aspirin over the counter     vitals on admission were as: 133/65 mmHg- 68 beats/min - 98% room air- temp 98.8 F    CT angio head and neck and CT head were negative   MRI positive for acute cerebellar ischemia   admitted to stroke unit (22 Dec 2019 12:51)      PAST MEDICAL & SURGICAL HISTORY:  No pertinent past medical history  No significant past surgical history      Hospital Course:    TODAY'S SUBJECTIVE & REVIEW OF SYMPTOMS:     Constitutional WNL   Cardio WNL   Resp WNL   GI WNL  Heme WNL  Endo WNL  Skin WNL  MSK WNL  Neuro dizzy  Cognitive WNL  Psych WNL      MEDICATIONS  (STANDING):  aspirin enteric coated 81 milliGRAM(s) Oral daily  atorvastatin 80 milliGRAM(s) Oral at bedtime  heparin  Injectable 5000 Unit(s) SubCutaneous every 8 hours  influenza   Vaccine 0.5 milliLiter(s) IntraMuscular once  pantoprazole    Tablet 40 milliGRAM(s) Oral before breakfast  polyethylene glycol 3350 17 Gram(s) Oral daily  senna 1 Tablet(s) Oral daily  sodium chloride 0.9%. 1000 milliLiter(s) (50 mL/Hr) IV Continuous <Continuous>    MEDICATIONS  (PRN):  meclizine 25 milliGRAM(s) Oral every 6 hours PRN Dizziness      FAMILY HISTORY:  Family history of cerebrovascular accident (CVA) in father: in his 40s  Family history of coronary artery disease (Father): father with CVA in his 40s      Allergies    No Known Allergies    Intolerances        SOCIAL HISTORY:    [  ] Etoh  [  ] Smoking  [  ] Substance abuse     Home Environment:  [  ] Home Alone  [x  ] Lives with Family  [  ] Home Health Aid    Dwelling:  [  ] Apartment  [x  ] Private House  [  ] Adult Home  [  ] Skilled Nursing Facility      [  ] Short Term  [  ] Long Term  [ x ] Stairs       Elevator [  ]    FUNCTIONAL STATUS PTA: (Check all that apply)  Ambulation: [ x  ]Independent    [  ] Dependent     [  ] Non-Ambulatory  Assistive Device: [  ] SA Cane  [  ]  Q Cane  [  ] Walker  [  ]  Wheelchair  ADL : [ x ] Independent  [  ]  Dependent       Vital Signs Last 24 Hrs  T(C): 36.4 (24 Dec 2019 10:27), Max: 36.4 (23 Dec 2019 19:02)  T(F): 97.5 (24 Dec 2019 10:27), Max: 97.6 (23 Dec 2019 19:02)  HR: 87 (24 Dec 2019 10:27) (56 - 87)  BP: 122/61 (24 Dec 2019 10:27) (111/56 - 151/67)  BP(mean): --  RR: 20 (24 Dec 2019 10:27) (17 - 20)  SpO2: 98% (23 Dec 2019 20:16) (97% - 98%)      PHYSICAL EXAM: Alert & Oriented X3  GENERAL: NAD, well-groomed, well-developed  HEAD:  Atraumatic, Normocephalic  CHEST/LUNG: Clear   HEART: S1S2+  ABDOMEN: Soft, Nontender  EXTREMITIES:  no calf tenderness    NERVOUS SYSTEM:  Cranial Nerves 2-12 intact [x  ] Abnormal  [  ]  ROM: WFL all extremities [x  ]  Abnormal [  ]  Motor Strength: WFL all extremities  [x  ]  Abnormal [  ]  Sensation: intact to light touch [x  ] Abnormal [  ]  Reflexes: Symmetric [  ]  Abnormal [  ]    FUNCTIONAL STATUS:  Bed Mobility: Independent [  ]  Supervision [ x ]  Needs Assistance [  ]  N/A [  ]  Transfers: Independent [  ]  Supervision [ x ]  Needs Assistance [  ]  N/A [  ]   Ambulation: Independent [  ]  Supervision [ x ]  Needs Assistance [  ]  N/A [  ]  ADL: Independent [  ] Requires Assistance [  ] N/A [  ]      LABS:                        13.7   9.95  )-----------( 269      ( 24 Dec 2019 05:55 )             42.0     12-    139  |  101  |  13  ----------------------------<  87  3.9   |  24  |  1.0    Ca    8.7      24 Dec 2019 05:55  Mg     2.1     12-24    TPro  6.4  /  Alb  3.8  /  TBili  0.5  /  DBili  x   /  AST  14  /  ALT  20  /  AlkPhos  87  12-      Urinalysis Basic - ( 22 Dec 2019 18:28 )    Color: Light Yellow / Appearance: Clear / S.013 / pH: x  Gluc: x / Ketone: Negative  / Bili: Negative / Urobili: <2 mg/dL   Blood: x / Protein: Negative / Nitrite: Negative   Leuk Esterase: Negative / RBC: x / WBC x   Sq Epi: x / Non Sq Epi: x / Bacteria: x        RADIOLOGY & ADDITIONAL STUDIES:    Assesment:

## 2019-12-24 NOTE — SWALLOW BEDSIDE ASSESSMENT ADULT - SLP GENERAL OBSERVATIONS
Pt awake no c/o pain, speech clear.
Pt awake and alert, A+O x4. Speech clear, reports no difficulty swallowing. Family at bedside

## 2019-12-24 NOTE — PROGRESS NOTE ADULT - ATTENDING COMMENTS
Pre-procedure Assessment:  Patient seen and examined. I agree with the history and physical which I have reviewed and noted any changes below.  12-23-19 @ 11:58
Patient seen and examined and agree with above except as noted.  Reviewed prior history, imaging, notes, labs and vitals reviewed.  Patient seen and examined and an o new complaints.  Feels he is walking better but still initially off balance when he first gets up.  NO events on telemetry    Plan as above (Follow up in 1-2 weeks in stroke clinic upon dc)
Patient seen and examined with NP.  Only event overnight was bradycardia which is likely due too undiagnosed sleep apnea.  Otherwise no complaints and ambulating well with no dizziness    Plan as above

## 2019-12-24 NOTE — CHART NOTE - NSCHARTNOTEFT_GEN_A_CORE
<<<RESIDENT DISCHARGE NOTE>>>     JENNI PETERS  MRN-[id number]    VITAL SIGNS:  T(F): 97.5 (12-24-19 @ 05:32), Max: 97.6 (12-23-19 @ 19:02)  HR: 60 (12-24-19 @ 05:32)  BP: 133/73 (12-24-19 @ 05:32)  SpO2: 98% (12-23-19 @ 20:16)  Weight (kg): 97.5 (12-23-19 @ 15:14)  BMI (kg/m2): 35.8 (12-23-19 @ 15:14)    PHYSICAL EXAMINATION:  GENERAL: NAD, well-developed, AAOx3  HEENT:  Atraumatic, Normocephalic. EOMI, PERRLA, conjunctiva and sclera clear, No JVD  PULMONARY: Clear to auscultation bilaterally; No wheeze  CARDIOVASCULAR: Regular rate and rhythm; No murmurs, rubs, or gallops  GASTROINTESTINAL: Soft, Nontender, Nondistended; Bowel sounds present  MUSCULOSKELETAL:  2+ Peripheral Pulses, No clubbing, cyanosis, or edema  NEUROLOGY: non-focal, strength 5/5 UE & LE  SKIN: No rashes or lesions      TEST RESULTS:                        13.7   9.95  )-----------( 269      ( 24 Dec 2019 05:55 )             42.0       12-24    139  |  101  |  13  ----------------------------<  87  3.9   |  24  |  1.0    Ca    8.7      24 Dec 2019 05:55  Mg     2.1     12-24    TPro  6.4  /  Alb  3.8  /  TBili  0.5  /  DBili  x   /  AST  14  /  ALT  20  /  AlkPhos  87  12-23      FINAL DISCHARGE INTERVIEW:  Resident(s) Present: (Name: Dr. Phan_____________), RN Present: (Name:  ___________)    DISCHARGE MEDICATION RECONCILIATION  reviewed with Attending (Name:_Dr. Morris__________)    DISPOSITION:   [ x ] Home,    [  ] Home with Visiting Nursing Services,   [    ]  SNF/ NH,    [   ] Acute Rehab (4A),   [   ] Other (Specify:_________) <<<RESIDENT DISCHARGE NOTE>>>     JENNI PETERS  MRN-[id number]    VITAL SIGNS:  T(F): 97.5 (12-24-19 @ 05:32), Max: 97.6 (12-23-19 @ 19:02)  HR: 60 (12-24-19 @ 05:32)  BP: 133/73 (12-24-19 @ 05:32)  SpO2: 98% (12-23-19 @ 20:16)  Weight (kg): 97.5 (12-23-19 @ 15:14)  BMI (kg/m2): 35.8 (12-23-19 @ 15:14)    PHYSICAL EXAMINATION:  GENERAL: NAD, well-developed, AAOx3  HEENT:  Atraumatic, Normocephalic. EOMI, PERRLA, conjunctiva and sclera clear, No JVD  PULMONARY: Clear to auscultation bilaterally; No wheeze  CARDIOVASCULAR: Regular rate and rhythm; No murmurs, rubs, or gallops  GASTROINTESTINAL: Soft, Nontender, Nondistended; Bowel sounds present  MUSCULOSKELETAL:  2+ Peripheral Pulses, No clubbing, cyanosis, or edema  NEUROLOGY: non-focal, strength 5/5 UE & LE  SKIN: No rashes or lesions      TEST RESULTS:                        13.7   9.95  )-----------( 269      ( 24 Dec 2019 05:55 )             42.0       12-24    139  |  101  |  13  ----------------------------<  87  3.9   |  24  |  1.0    Ca    8.7      24 Dec 2019 05:55  Mg     2.1     12-24    TPro  6.4  /  Alb  3.8  /  TBili  0.5  /  DBili  x   /  AST  14  /  ALT  20  /  AlkPhos  87  12-23      FINAL DISCHARGE INTERVIEW:  Resident(s) Present: (Name: Dr. Phan_____________), RN Present: (Name:  Tasia__________)    DISCHARGE MEDICATION RECONCILIATION  reviewed with Attending (Name:_Dr. Morris__________)    DISPOSITION:   [ x ] Home,    [  ] Home with Visiting Nursing Services,   [    ]  SNF/ NH,    [   ] Acute Rehab (4A),   [   ] Other (Specify:_________)

## 2019-12-24 NOTE — PROGRESS NOTE ADULT - ASSESSMENT
Impression:  Impression:  53 year old gentleman with no significant PMH presents to the ED with acute onset room spinning dizziness. Patient developed dizziness at home with associated nausea and vomiting. Compazine and Zofran effective but gait ataxia persists. CTH failed to reveal acute intracranial pathology. CTA head and neck were unremarkable, MRI brain revealed a cerebellar ischemic stroke. Etiology of stroke unknown, will have a better understanding post stroke workup.     Suggestion:  PAOLA  Hypercoagulable labs   A1C  Telemetry monitoring.   PT OT Rehab      Disposition:  Follow up with the stroke clinic in 1-2 weeks    Marquise Borges NP  [phone]

## 2019-12-24 NOTE — DISCHARGE NOTE NURSING/CASE MANAGEMENT/SOCIAL WORK - PATIENT PORTAL LINK FT
You can access the FollowMyHealth Patient Portal offered by [facility] by registering at the following website: [email or web address]. By joining [employer]s FollowMyHealth portal, you will also be able to view your health information using other applications (apps) compatible with our system.

## 2019-12-26 PROBLEM — Z78.9 OTHER SPECIFIED HEALTH STATUS: Chronic | Status: ACTIVE | Noted: 2019-12-21

## 2019-12-26 LAB
AT III ACT/NOR PPP CHRO: 84 % — LOW (ref 85–135)
AT III AG PPP IA-MCNC: 21 MG/DL — SIGNIFICANT CHANGE UP (ref 19–31)

## 2019-12-27 PROBLEM — Z00.00 ENCOUNTER FOR PREVENTIVE HEALTH EXAMINATION: Status: ACTIVE | Noted: 2019-12-27

## 2019-12-27 LAB — AT III ACT/NOR PPP CHRO: 74 % — LOW (ref 85–135)

## 2019-12-28 LAB
AT III AG PPP IA-MCNC: 17 MG/DL — LOW (ref 19–31)
CULTURE RESULTS: SIGNIFICANT CHANGE UP
PROT S FREE PPP-ACNC: 90 % NORMAL — SIGNIFICANT CHANGE UP (ref 70–150)
SPECIMEN SOURCE: SIGNIFICANT CHANGE UP

## 2019-12-30 ENCOUNTER — APPOINTMENT (OUTPATIENT)
Dept: NEUROLOGY | Facility: CLINIC | Age: 54
End: 2019-12-30
Payer: COMMERCIAL

## 2019-12-30 VITALS
WEIGHT: 214 LBS | HEART RATE: 65 BPM | SYSTOLIC BLOOD PRESSURE: 116 MMHG | BODY MASS INDEX: 35.65 KG/M2 | DIASTOLIC BLOOD PRESSURE: 64 MMHG | HEIGHT: 65 IN

## 2019-12-30 DIAGNOSIS — Z78.9 OTHER SPECIFIED HEALTH STATUS: ICD-10-CM

## 2019-12-30 DIAGNOSIS — R27.0 ATAXIA, UNSPECIFIED: ICD-10-CM

## 2019-12-30 DIAGNOSIS — R29.700 NIHSS SCORE 0: ICD-10-CM

## 2019-12-30 DIAGNOSIS — R42 DIZZINESS AND GIDDINESS: ICD-10-CM

## 2019-12-30 DIAGNOSIS — G47.33 OBSTRUCTIVE SLEEP APNEA (ADULT) (PEDIATRIC): ICD-10-CM

## 2019-12-30 DIAGNOSIS — Z82.3 FAMILY HISTORY OF STROKE: ICD-10-CM

## 2019-12-30 DIAGNOSIS — Z82.49 FAMILY HISTORY OF ISCHEMIC HEART DISEASE AND OTHER DISEASES OF THE CIRCULATORY SYSTEM: ICD-10-CM

## 2019-12-30 DIAGNOSIS — I63.9 CEREBRAL INFARCTION, UNSPECIFIED: ICD-10-CM

## 2019-12-30 DIAGNOSIS — R00.1 BRADYCARDIA, UNSPECIFIED: ICD-10-CM

## 2019-12-30 LAB
DNA PLOIDY SPEC FC-IMP: SIGNIFICANT CHANGE UP
MTHFR GENE INTERPRETATION: SIGNIFICANT CHANGE UP
PTR INTERPRETATION: SIGNIFICANT CHANGE UP

## 2019-12-30 PROCEDURE — 99214 OFFICE O/P EST MOD 30 MIN: CPT

## 2019-12-30 RX ORDER — ATORVASTATIN CALCIUM 80 MG/1
80 TABLET, FILM COATED ORAL
Refills: 0 | Status: ACTIVE | COMMUNITY

## 2019-12-30 RX ORDER — ASPIRIN ENTERIC COATED TABLETS 81 MG 81 MG/1
81 TABLET, DELAYED RELEASE ORAL DAILY
Refills: 0 | Status: ACTIVE | COMMUNITY

## 2019-12-30 NOTE — HISTORY OF PRESENT ILLNESS
[FreeTextEntry1] : 53 year old gentleman with no significant PMH presents to the ED with acute \par onset room spinning dizziness. Patient developed dizziness at 1800 with \par associated nausea and vomiting. Compazine and Zofran effective but gait ataxia \par persists.Patient reported he just started eating his dinner and suddenly felt \par warm and sweaty. He then started feeling dizzy and unable to walk so he called \par his friend at work who called EMS. Patient then started multiple NB/NB \par vomiting. denies HA/chest pain/abd pain/diarrhea/urinary sxs/ ear pain and \par tinnitus. denies hx of vertigo. patient never took aspirin over the counter. vitals on admission were as: \par 133/65 mmHg- 68 beats/min - 98% room air- temp 98.8 F. CT angio head and neck \par and CT head were negative . MRI showed Punctate acute infarct in the medial \par left cerebellar hemisphere/vermis. Admitted to stroke unit . He was started on \par aspirin and statin. PAOLA was done on 2019 and hypercoagulable -pending. \par lipid panel,: T  LDL:108, CHol:154  HDl: 28. He had his PAOLA on 2019 \par , it was clean. Physiatry has seen him and he worked with PT. His gait is much \par stable now. He had episodes of sinus bradycardia overnight likely secondary to \par JUDIE. \par \par Since discharge he has no new complaints and only abnormality on bloodwork was antithrombin activity was slightly low but he has a appointment with a hematologist to follow up on this.\par He is wearing the event monitor.\par

## 2019-12-30 NOTE — PHYSICAL EXAM
[FreeTextEntry1] : A+Ox3 langiage and attention normal\par CN 2-12 normal\par power 5/5\par Temp, Vib, LT symmetric\par FTN NL\par Gait normal\par Tandem abnormal\par \par NIHSS 0\par mrankin 0\par

## 2019-12-30 NOTE — DISCUSSION/SUMMARY
[FreeTextEntry1] : Mr. Tang is a 55yo man with recent left cerebellar stroke of unclear etiology (cryptogenic).\par 1. F/u event monitor\par 2. f/u with Heme/onc\par 3. Continue ASA 81mg and statin\par 4. F/u with pulmonary for sleep study\par 5. Diet and exercise and adequate hydration\par 6. Can return to work 2 weeks after admission\par 7. f/u in 6 months

## 2019-12-31 LAB — PROT S FREE PPP-ACNC: 99 % NORMAL — SIGNIFICANT CHANGE UP (ref 70–150)

## 2020-06-17 ENCOUNTER — APPOINTMENT (OUTPATIENT)
Dept: NEUROLOGY | Facility: CLINIC | Age: 55
End: 2020-06-17

## 2020-10-30 ENCOUNTER — TRANSCRIPTION ENCOUNTER (OUTPATIENT)
Age: 55
End: 2020-10-30

## 2020-11-09 NOTE — PROGRESS NOTE ADULT - SUBJECTIVE AND OBJECTIVE BOX
Patient rescheduled to 12-10-20.   Pt seen and examined independently. No new complaints. Feeling better. Cleared by PT and neuro. Asked Dr. Bishop to see pt and arrange for event monitor.    Gen: NAD, AAOx3  CV: S1 S2  Resp: Decreased BS b/l  GI: NT/ND/S +BS  MS: neg c/c/e  Neuro: nonfocal    Discharge instructions discussed and patient/spouse know when to seek immediate medical attention.  Patient has proper follow up.  All results discussed and patient/spouse aware they may require further work up.  Stressed importance of proper follow up.  Medications prescribed and changes discussed.  All questions and concerns from patient and family addressed. Understanding of instructions verbalized.    Time spent in completing discharge process and coordinating care 45 minutes.    Discussed with housestaff, nursing, social work, neuro, EP

## 2020-11-30 ENCOUNTER — TRANSCRIPTION ENCOUNTER (OUTPATIENT)
Age: 55
End: 2020-11-30

## 2020-12-04 NOTE — PATIENT PROFILE ADULT - JOB HELP
Radiation Treatment Summary  Patricia Encarnacionqvi  1957    Dear  Rosy,     Thank you for entrusting us with your care at this time.  We will do our very best to make sure that you are well informed and have full access to our support staff every step of your journey.    As all patients treatments are individualized, the final number of treatments and final dose may, in rare cases, need to be changed.  Please find the below a summary of your treatments.    Reason for treatment: Primary C79.51 - Secondary malignant neoplasm of bone, Diagnosed 3/2019 (Active)    Primary C50.112 - Malignant neoplasm of central portion of left female breast, Diagnosed 4/6/2017 (Active)    Area being treated: Pelvis    Total Dose: 3000 centiGray    Total Number of Treatments: 10     Please rest assured that although the radiation and nursing staff have reviewed with you the course of treatments, the benefits and side effects of the treatment- we are always here to answer any and all of your questions.      Thank you,    Electronically Approved By: Genaro Tyson MD     Date: 4/4/2019 1:54:09 PM  Genaro Tyson M.D.  Radiation Oncology        
no

## 2021-01-10 ENCOUNTER — TRANSCRIPTION ENCOUNTER (OUTPATIENT)
Age: 56
End: 2021-01-10

## 2021-01-27 ENCOUNTER — TRANSCRIPTION ENCOUNTER (OUTPATIENT)
Age: 56
End: 2021-01-27

## 2021-02-16 ENCOUNTER — TRANSCRIPTION ENCOUNTER (OUTPATIENT)
Age: 56
End: 2021-02-16

## 2021-05-24 ENCOUNTER — APPOINTMENT (OUTPATIENT)
Age: 56
End: 2021-05-24
Payer: COMMERCIAL

## 2021-05-24 VITALS
WEIGHT: 240 LBS | OXYGEN SATURATION: 97 % | BODY MASS INDEX: 39.99 KG/M2 | RESPIRATION RATE: 14 BRPM | HEART RATE: 75 BPM | HEIGHT: 65 IN | DIASTOLIC BLOOD PRESSURE: 80 MMHG | SYSTOLIC BLOOD PRESSURE: 122 MMHG

## 2021-05-24 PROCEDURE — 99203 OFFICE O/P NEW LOW 30 MIN: CPT

## 2021-05-24 PROCEDURE — 99072 ADDL SUPL MATRL&STAF TM PHE: CPT

## 2021-05-24 NOTE — HISTORY OF PRESENT ILLNESS
[Initial Evaluation] : an initial evaluation of [Excessive Daytime Sleepiness] : excessive daytime sleepiness [Snoring] : snoring [Unrefreshing Sleep] : unrefreshing sleep [Sleepy When Sedentary] : sleepy when sedentary [Currently Experiencing] : The patient is currently experiencing symptoms. [None] : No associated symptoms are reported [Good Sleep Hygiene] : good sleep hygiene

## 2021-06-17 ENCOUNTER — OUTPATIENT (OUTPATIENT)
Dept: OUTPATIENT SERVICES | Facility: HOSPITAL | Age: 56
LOS: 1 days | Discharge: HOME | End: 2021-06-17
Payer: COMMERCIAL

## 2021-06-17 PROCEDURE — 95806 SLEEP STUDY UNATT&RESP EFFT: CPT | Mod: 26

## 2021-06-18 DIAGNOSIS — G47.33 OBSTRUCTIVE SLEEP APNEA (ADULT) (PEDIATRIC): ICD-10-CM

## 2021-07-07 ENCOUNTER — APPOINTMENT (OUTPATIENT)
Age: 56
End: 2021-07-07
Payer: COMMERCIAL

## 2021-07-07 VITALS
SYSTOLIC BLOOD PRESSURE: 118 MMHG | HEART RATE: 78 BPM | HEIGHT: 65 IN | WEIGHT: 235 LBS | RESPIRATION RATE: 14 BRPM | OXYGEN SATURATION: 98 % | BODY MASS INDEX: 39.15 KG/M2 | DIASTOLIC BLOOD PRESSURE: 70 MMHG

## 2021-07-07 PROCEDURE — 99213 OFFICE O/P EST LOW 20 MIN: CPT | Mod: 25

## 2021-07-07 PROCEDURE — 99072 ADDL SUPL MATRL&STAF TM PHE: CPT

## 2021-07-07 PROCEDURE — G0447 BEHAVIOR COUNSEL OBESITY 15M: CPT

## 2021-07-07 NOTE — COUNSELING
[Potential consequences of obesity discussed] : Potential consequences of obesity discussed [Benefits of weight loss discussed] : Benefits of weight loss discussed [Good understanding] : Patient has a good understanding of disease, goals and obesity follow-up plan [FreeTextEntry4] : 16

## 2021-07-07 NOTE — HISTORY OF PRESENT ILLNESS
[Follow-Up - Routine Clinic] : a routine clinic follow-up of [Excessive Daytime Sleepiness] : excessive daytime sleepiness [Snoring] : snoring [Unrefreshing Sleep] : unrefreshing sleep [Sleepy When Sedentary] : sleepy when sedentary [Currently Experiencing] : The patient is currently experiencing symptoms. [None] : No associated symptoms are reported [Good Sleep Hygiene] : good sleep hygiene

## 2021-08-14 ENCOUNTER — TRANSCRIPTION ENCOUNTER (OUTPATIENT)
Age: 56
End: 2021-08-14

## 2021-08-18 ENCOUNTER — TRANSCRIPTION ENCOUNTER (OUTPATIENT)
Age: 56
End: 2021-08-18

## 2021-08-30 ENCOUNTER — EMERGENCY (EMERGENCY)
Facility: HOSPITAL | Age: 56
LOS: 0 days | Discharge: HOME | End: 2021-08-30
Attending: EMERGENCY MEDICINE | Admitting: EMERGENCY MEDICINE
Payer: COMMERCIAL

## 2021-08-30 ENCOUNTER — TRANSCRIPTION ENCOUNTER (OUTPATIENT)
Age: 56
End: 2021-08-30

## 2021-08-30 VITALS
TEMPERATURE: 100 F | DIASTOLIC BLOOD PRESSURE: 74 MMHG | RESPIRATION RATE: 20 BRPM | SYSTOLIC BLOOD PRESSURE: 166 MMHG | HEIGHT: 65 IN | HEART RATE: 94 BPM | WEIGHT: 220.02 LBS | OXYGEN SATURATION: 96 %

## 2021-08-30 DIAGNOSIS — R05 COUGH: ICD-10-CM

## 2021-08-30 DIAGNOSIS — G47.30 SLEEP APNEA, UNSPECIFIED: ICD-10-CM

## 2021-08-30 DIAGNOSIS — E78.5 HYPERLIPIDEMIA, UNSPECIFIED: ICD-10-CM

## 2021-08-30 DIAGNOSIS — R50.9 FEVER, UNSPECIFIED: ICD-10-CM

## 2021-08-30 DIAGNOSIS — Z90.49 ACQUIRED ABSENCE OF OTHER SPECIFIED PARTS OF DIGESTIVE TRACT: ICD-10-CM

## 2021-08-30 DIAGNOSIS — Z20.822 CONTACT WITH AND (SUSPECTED) EXPOSURE TO COVID-19: ICD-10-CM

## 2021-08-30 DIAGNOSIS — Z79.82 LONG TERM (CURRENT) USE OF ASPIRIN: ICD-10-CM

## 2021-08-30 DIAGNOSIS — Z86.73 PERSONAL HISTORY OF TRANSIENT ISCHEMIC ATTACK (TIA), AND CEREBRAL INFARCTION WITHOUT RESIDUAL DEFICITS: ICD-10-CM

## 2021-08-30 PROCEDURE — 71046 X-RAY EXAM CHEST 2 VIEWS: CPT | Mod: 26

## 2021-08-30 PROCEDURE — 93010 ELECTROCARDIOGRAM REPORT: CPT

## 2021-08-30 PROCEDURE — 99285 EMERGENCY DEPT VISIT HI MDM: CPT

## 2021-08-30 PROCEDURE — 71045 X-RAY EXAM CHEST 1 VIEW: CPT | Mod: 26,59

## 2021-08-30 RX ORDER — ALBUTEROL 90 UG/1
1 AEROSOL, METERED ORAL ONCE
Refills: 0 | Status: COMPLETED | OUTPATIENT
Start: 2021-08-30 | End: 2021-08-30

## 2021-08-30 RX ADMIN — ALBUTEROL 1 PUFF(S): 90 AEROSOL, METERED ORAL at 22:47

## 2021-08-30 NOTE — ED ADULT TRIAGE NOTE - CHIEF COMPLAINT QUOTE
pt presents with cough x 3 weeks. pt denies fevers @ home. Pt states he is having rib pain d/t excessive coughing.

## 2021-08-30 NOTE — ED PROVIDER NOTE - PHYSICAL EXAMINATION
CONSTITUTIONAL: Well-appearing; well-nourished; in no apparent distress.   EYES: PERRL; EOM intact.   ENT: normal nose; no rhinorrhea; normal pharynx with no tonsillar hypertrophy.   NECK: Supple; non-tender; no cervical lymphadenopathy.   CARDIOVASCULAR: Normal S1, S2; no murmurs, rubs, or gallops.   RESPIRATORY: Normal chest excursion with respiration; breath sounds clear and equal bilaterally; no wheezes, rhonchi, or rales.  GI/: Normal bowel sounds; non-distended; non-tender; no palpable organomegaly.   MS: No evidence of trauma or deformity. Normal ROM in all four extremities; non-tender to palpation; distal pulses are normal.   SKIN: Normal for age and race; warm; dry; good turgor; no apparent lesions or exudate.   NEURO/PSYCH: A & O x 4; grossly unremarkable. mood and manner are appropriate.

## 2021-08-30 NOTE — ED PROVIDER NOTE - PATIENT PORTAL LINK FT
You can access the FollowMyHealth Patient Portal offered by [facility] by registering at the following website: [email or web address]. By joining [employer]’s FollowMyHealth portal, you will also be able to view your health information using other applications (apps) compatible with our system.

## 2021-08-30 NOTE — ED PROVIDER NOTE - OBJECTIVE STATEMENT
55 year old M with no pmhx c/o mostly dry cough x 3 weeks. sts was initially having low grade fever at home but has since resolved. Sts was seen in UC, given inhaler and 5 day course of abx which he finished. Sts daughter had tested covid +. Pt has had 4 recent neg covid tests. No chest pain, sob, nausea, vomiting, diarrhea, leg pain/swelling, recent travel, smoking hx.

## 2021-08-30 NOTE — ED PROVIDER NOTE - CLINICAL SUMMARY MEDICAL DECISION MAKING FREE TEXT BOX
54 yo male with PMH of HLD, CVA, sleep apnea, appendectomy presents to the ER for cough x 3 weeks. Pt had some low grade fevers over this time, now improved, and notes that his daughter tested + for covid, but he had tested negative. Pt had gone to an [facility], given inhaler and Z-olegario. States he's still coughing, and now with some bilateral rib pain from coughing so much. Denies post tussive vomiting, denies fever, denies abdomen pain/n/v/d/rash/leg pain or swelling/CP. Agree with PA exam and management. Ordered RVP, CXR, EKG, given albuterol. Results were neg (normal xray, neg RVP, EKG nl). Pt may have bronchitis, and explained that it could take up to 10-12 weeks to improve. Should continue with albuterol MDI, will add tessalon perles and recommend follow up with his PMD. Return to ER for any worsening of symptoms.

## 2021-08-30 NOTE — ED PROVIDER NOTE - ATTENDING CONTRIBUTION TO CARE
56 yo male with PMH of HLD, CVA, sleep apnea, appendectomy presents to the ER for cough x 3 weeks. Pt had some low grade fevers over this time, now improved, and notes that his daughter tested + for covid, but he had tested negative. Pt had gone to an [facility], given inhaler and Z-olegario. States he's still coughing, and now with some bilateral rib pain from coughing so much. Denies post tussive vomiting, denies fever, denies abdomen pain/n/v/d/rash/leg pain or swelling/CP. Agree with PA exam and management. Ordered RVP, CXR,  EKG, given albuterol. Results were neg (normal xray, neg RVP, EKG nl). Pt may have bronchitis, and explained that it could take up to 10-12 weeks to improve. Should continue with albuterol MDI, will add tessalon perles and recommend follow up with his PMD. Return to ER for any worsening of symptoms.    ALL: nkda  PMH as above  Meds asa 81, completed a zpak, otc cough med, albuterol MDI  SH no smoking, +etoh occ  PMD Farid

## 2021-08-30 NOTE — ED PROVIDER NOTE - NS ED ROS FT
Constitutional: no fever, chills, no recent weight loss, change in appetite or malaise  Eyes: no redness/discharge/pain/vision changes  ENT: no rhinorrhea/ear pain/sore throat  Cardiac: No chest pain, SOB or edema.  Respiratory: + cough. No respiratory distress  GI: No nausea, vomiting, diarrhea or abdominal pain.  : No dysuria, frequency, urgency or hematuria  MS: no pain to back or extremities, no loss of ROM, no weakness  Neuro: No headache or weakness. No LOC.  Skin: No skin rash.  Endocrine: No history of thyroid disease or diabetes.  Except as documented in the HPI, all other systems are negative.

## 2021-08-31 LAB
RAPID RVP RESULT: SIGNIFICANT CHANGE UP
SARS-COV-2 RNA SPEC QL NAA+PROBE: SIGNIFICANT CHANGE UP

## 2021-09-13 ENCOUNTER — APPOINTMENT (OUTPATIENT)
Age: 56
End: 2021-09-13

## 2021-12-05 ENCOUNTER — TRANSCRIPTION ENCOUNTER (OUTPATIENT)
Age: 56
End: 2021-12-05

## 2022-03-09 ENCOUNTER — APPOINTMENT (OUTPATIENT)
Age: 57
End: 2022-03-09
Payer: COMMERCIAL

## 2022-03-09 VITALS
OXYGEN SATURATION: 97 % | HEIGHT: 65 IN | BODY MASS INDEX: 40.32 KG/M2 | WEIGHT: 242 LBS | SYSTOLIC BLOOD PRESSURE: 110 MMHG | DIASTOLIC BLOOD PRESSURE: 60 MMHG | HEART RATE: 62 BPM | RESPIRATION RATE: 12 BRPM

## 2022-03-09 DIAGNOSIS — G47.33 OBSTRUCTIVE SLEEP APNEA (ADULT) (PEDIATRIC): ICD-10-CM

## 2022-03-09 PROCEDURE — 99213 OFFICE O/P EST LOW 20 MIN: CPT

## 2022-03-09 NOTE — ASSESSMENT
[FreeTextEntry1] : Severe JUDEI with severe O2 desaturation on APAP \par Awaiting CPAP titration to make sure no need for nocturnal O2 \par PAtient with HO CVA

## 2022-03-09 NOTE — HISTORY OF PRESENT ILLNESS
[Follow-Up - Routine Clinic] : a routine clinic follow-up of [Excessive Daytime Sleepiness] : excessive daytime sleepiness [Snoring] : snoring [Unrefreshing Sleep] : unrefreshing sleep [Sleepy When Sedentary] : sleepy when sedentary [None] : No associated symptoms are reported [Currently Experiencing] : The patient is currently experiencing symptoms. [Good Sleep Hygiene] : good sleep hygiene

## 2022-03-09 NOTE — PHYSICAL EXAM
no fever/no hematuria [No Acute Distress] : no acute distress [Normal Oropharynx] : normal oropharynx [Normal Appearance] : normal appearance [No Neck Mass] : no neck mass [Normal Rate/Rhythm] : normal rate/rhythm [Normal S1, S2] : normal s1, s2 [No Murmurs] : no murmurs [No Resp Distress] : no resp distress [Clear to Auscultation Bilaterally] : clear to auscultation bilaterally [No Abnormalities] : no abnormalities [Benign] : benign [Normal Gait] : normal gait [No Clubbing] : no clubbing [No Cyanosis] : no cyanosis [No Edema] : no edema [FROM] : FROM [Normal Color/ Pigmentation] : normal color/ pigmentation [No Focal Deficits] : no focal deficits [Oriented x3] : oriented x3 [Normal Affect] : normal affect

## 2022-03-23 NOTE — ED CDU PROVIDER INITIAL DAY NOTE - NEUROLOGICAL, MLM
Problem: PAIN - PEDIATRIC  Goal: Verbalizes/displays adequate comfort level or baseline comfort level  Description: Interventions:  - Encourage patient to monitor pain and request assistance  - Assess pain using appropriate pain scale  - Administer analgesics based on type and severity of pain and evaluate response  - Implement non-pharmacological measures as appropriate and evaluate response  - Consider cultural and social influences on pain and pain management  - Notify physician/advanced practitioner if interventions unsuccessful or patient reports new pain  Outcome: Progressing     Problem: SAFETY PEDIATRIC - FALL  Goal: Patient will remain free from falls  Description: INTERVENTIONS:  - Assess patient frequently for fall risks   - Identify cognitive and physical deficits and behaviors that affect risk of falls    - [place] fall precautions as indicated by assessment using Humpty Dumpty scale  - Educate patient/family on patient safety utilizing HD scale  - Instruct patient to call for assistance with activity based on assessment  - Modify environment to reduce risk of injury  Outcome: Progressing     Problem: DISCHARGE PLANNING  Goal: Discharge to home or other facility with appropriate resources  Description: INTERVENTIONS:  - Identify barriers to discharge w/patient and caregiver  - Arrange for needed discharge resources and transportation as appropriate  - Identify discharge learning needs (meds, wound care, etc )  - Arrange for interpretive services to assist at discharge as needed  - Refer to Case Management Department for coordinating discharge planning if the patient needs post-hospital services based on physician/advanced practitioner order or complex needs related to functional status, cognitive ability, or social support system  Outcome: Progressing Alert and oriented, no focal deficits, no motor or sensory deficits.

## 2022-05-11 ENCOUNTER — NON-APPOINTMENT (OUTPATIENT)
Age: 57
End: 2022-05-11

## 2022-06-08 ENCOUNTER — APPOINTMENT (OUTPATIENT)
Age: 57
End: 2022-06-08

## 2022-06-18 ENCOUNTER — EMERGENCY (EMERGENCY)
Facility: HOSPITAL | Age: 57
LOS: 0 days | Discharge: HOME | End: 2022-06-18
Attending: EMERGENCY MEDICINE | Admitting: EMERGENCY MEDICINE
Payer: COMMERCIAL

## 2022-06-18 VITALS
RESPIRATION RATE: 17 BRPM | TEMPERATURE: 98 F | SYSTOLIC BLOOD PRESSURE: 137 MMHG | DIASTOLIC BLOOD PRESSURE: 73 MMHG | HEART RATE: 61 BPM | HEIGHT: 65 IN | OXYGEN SATURATION: 99 %

## 2022-06-18 DIAGNOSIS — Z86.73 PERSONAL HISTORY OF TRANSIENT ISCHEMIC ATTACK (TIA), AND CEREBRAL INFARCTION WITHOUT RESIDUAL DEFICITS: ICD-10-CM

## 2022-06-18 DIAGNOSIS — Z86.16 PERSONAL HISTORY OF COVID-19: ICD-10-CM

## 2022-06-18 DIAGNOSIS — L60.8 OTHER NAIL DISORDERS: ICD-10-CM

## 2022-06-18 DIAGNOSIS — G47.33 OBSTRUCTIVE SLEEP APNEA (ADULT) (PEDIATRIC): ICD-10-CM

## 2022-06-18 DIAGNOSIS — Z79.82 LONG TERM (CURRENT) USE OF ASPIRIN: ICD-10-CM

## 2022-06-18 LAB
ALBUMIN SERPL ELPH-MCNC: 4.4 G/DL — SIGNIFICANT CHANGE UP (ref 3.5–5.2)
ALP SERPL-CCNC: 100 U/L — SIGNIFICANT CHANGE UP (ref 30–115)
ALT FLD-CCNC: 32 U/L — SIGNIFICANT CHANGE UP (ref 0–41)
ANION GAP SERPL CALC-SCNC: 13 MMOL/L — SIGNIFICANT CHANGE UP (ref 7–14)
AST SERPL-CCNC: 25 U/L — SIGNIFICANT CHANGE UP (ref 0–41)
BASOPHILS # BLD AUTO: 0.09 K/UL — SIGNIFICANT CHANGE UP (ref 0–0.2)
BASOPHILS NFR BLD AUTO: 0.9 % — SIGNIFICANT CHANGE UP (ref 0–1)
BILIRUB SERPL-MCNC: 0.5 MG/DL — SIGNIFICANT CHANGE UP (ref 0.2–1.2)
BUN SERPL-MCNC: 16 MG/DL — SIGNIFICANT CHANGE UP (ref 10–20)
CALCIUM SERPL-MCNC: 8.9 MG/DL — SIGNIFICANT CHANGE UP (ref 8.5–10.1)
CHLORIDE SERPL-SCNC: 101 MMOL/L — SIGNIFICANT CHANGE UP (ref 98–110)
CO2 SERPL-SCNC: 20 MMOL/L — SIGNIFICANT CHANGE UP (ref 17–32)
CREAT SERPL-MCNC: 1 MG/DL — SIGNIFICANT CHANGE UP (ref 0.7–1.5)
EGFR: 89 ML/MIN/1.73M2 — SIGNIFICANT CHANGE UP
EOSINOPHIL # BLD AUTO: 0.27 K/UL — SIGNIFICANT CHANGE UP (ref 0–0.7)
EOSINOPHIL NFR BLD AUTO: 2.7 % — SIGNIFICANT CHANGE UP (ref 0–8)
GLUCOSE SERPL-MCNC: 95 MG/DL — SIGNIFICANT CHANGE UP (ref 70–99)
HCT VFR BLD CALC: 43.6 % — SIGNIFICANT CHANGE UP (ref 42–52)
HGB BLD-MCNC: 14.7 G/DL — SIGNIFICANT CHANGE UP (ref 14–18)
IMM GRANULOCYTES NFR BLD AUTO: 0.4 % — HIGH (ref 0.1–0.3)
LYMPHOCYTES # BLD AUTO: 2.14 K/UL — SIGNIFICANT CHANGE UP (ref 1.2–3.4)
LYMPHOCYTES # BLD AUTO: 21.1 % — SIGNIFICANT CHANGE UP (ref 20.5–51.1)
MCHC RBC-ENTMCNC: 27.1 PG — SIGNIFICANT CHANGE UP (ref 27–31)
MCHC RBC-ENTMCNC: 33.7 G/DL — SIGNIFICANT CHANGE UP (ref 32–37)
MCV RBC AUTO: 80.4 FL — SIGNIFICANT CHANGE UP (ref 80–94)
MONOCYTES # BLD AUTO: 0.82 K/UL — HIGH (ref 0.1–0.6)
MONOCYTES NFR BLD AUTO: 8.1 % — SIGNIFICANT CHANGE UP (ref 1.7–9.3)
NEUTROPHILS # BLD AUTO: 6.8 K/UL — HIGH (ref 1.4–6.5)
NEUTROPHILS NFR BLD AUTO: 66.8 % — SIGNIFICANT CHANGE UP (ref 42.2–75.2)
NRBC # BLD: 0 /100 WBCS — SIGNIFICANT CHANGE UP (ref 0–0)
NT-PROBNP SERPL-SCNC: 39 PG/ML — SIGNIFICANT CHANGE UP (ref 0–300)
PLATELET # BLD AUTO: 326 K/UL — SIGNIFICANT CHANGE UP (ref 130–400)
POTASSIUM SERPL-MCNC: 4.5 MMOL/L — SIGNIFICANT CHANGE UP (ref 3.5–5)
POTASSIUM SERPL-SCNC: 4.5 MMOL/L — SIGNIFICANT CHANGE UP (ref 3.5–5)
PROT SERPL-MCNC: 7.3 G/DL — SIGNIFICANT CHANGE UP (ref 6–8)
RBC # BLD: 5.42 M/UL — SIGNIFICANT CHANGE UP (ref 4.7–6.1)
RBC # FLD: 13.7 % — SIGNIFICANT CHANGE UP (ref 11.5–14.5)
SODIUM SERPL-SCNC: 134 MMOL/L — LOW (ref 135–146)
TROPONIN T SERPL-MCNC: <0.01 NG/ML — SIGNIFICANT CHANGE UP
WBC # BLD: 10.16 K/UL — SIGNIFICANT CHANGE UP (ref 4.8–10.8)
WBC # FLD AUTO: 10.16 K/UL — SIGNIFICANT CHANGE UP (ref 4.8–10.8)

## 2022-06-18 PROCEDURE — 93010 ELECTROCARDIOGRAM REPORT: CPT

## 2022-06-18 PROCEDURE — 99285 EMERGENCY DEPT VISIT HI MDM: CPT

## 2022-06-18 NOTE — ED PROVIDER NOTE - PHYSICAL EXAMINATION
CONST: well appearing for age  HEAD:  normocephalic, atraumatic  EYES:  conjunctivae without injection, drainage or discharge  ENMT:  tympanic membranes pearly gray with normal landmarks; nasal mucosa moist; mouth moist without ulcerations or lesions; throat moist without erythema, exudate, ulcerations or lesions  NECK:  supple  CARDIAC:  regular rate and rhythm, normal S1 and S2, no murmurs, rubs or gallops  RESP:  respiratory rate and effort appear normal for age; lungs are clear to auscultation bilaterally; no rales or wheezes  ABDOMEN:  soft, nontender, nondistended  MUSCULOSKELETAL/NEURO:  AAOx3, CN II-XII grossly intact, normal movement, normal tone  EXTREMITIES: + black discoloration of bilateral 2nd-5th fingernails, cap refill < 2 seconds, +2 radial pulses  SKIN:  normal skin color for age and race, well-perfused; warm and dry

## 2022-06-18 NOTE — ED PROVIDER NOTE - NSFOLLOWUPINSTRUCTIONS_ED_ALL_ED_FT
Nail care     •Trim your nails often.      •Wash and dry your hands and feet every day.      General instructions     • Do not share personal items, such as towels or nail clippers.      • Do not walk barefoot in shower rooms or locker rooms.      •Wear rubber gloves if you are working with your hands in wet areas.      •Keep all follow-up visits. This is important.      Contact a health care provider if:    •You have redness, pain, or pus near the toenail or fingernail.      •Your infection is not getting better, or it is getting worse after several months.      •You have more circulation problems near the toenail or fingernail.      •You have brown or black discoloration of the nail that spreads to the surrounding skin.      •Take or apply over-the-counter and prescription medicines only as told by your health care provider.

## 2022-06-18 NOTE — ED PROVIDER NOTE - PROGRESS NOTE DETAILS
PS: Labs unremarkable. ECG showing NSR. Recommend pt will follow up with dermatology. Return precautions given

## 2022-06-18 NOTE — ED PROVIDER NOTE - PATIENT PORTAL LINK FT
You can access the FollowMyHealth Patient Portal offered by [facility] by registering at the following website: [email or web address]. By joining [employer]’s FollowMyHealth portal, you will also be able to view your health information using other applications (apps) compatible with our system.

## 2022-06-18 NOTE — ED PROVIDER NOTE - OBJECTIVE STATEMENT
Pt is a 57 y/o male with PMH of CVA and JUDIE presenting for nail discoloration x 1-2 weeks. Pt reports noticing black discoloration of his fingernails that has been constant. Denies any pruritis, rash, pain. No chest pain, shortness of breath. Able to do his daily activities as usual. Called his PMD Dr. Tam this morning who recommended he come to the ED. Reports he had an echo performed recently with his cardiologist which was normal. Also reports recovering from COVID shortly before discoloration started.

## 2022-06-18 NOTE — ED PROVIDER NOTE - NSICDXFAMILYHX_GEN_ALL_CORE_FT
FAMILY HISTORY:  Family history of cerebrovascular accident (CVA) in father, in his 40s    Father  Still living? Unknown  Family history of coronary artery disease, Age at diagnosis: 41-50

## 2022-06-18 NOTE — ED PROVIDER NOTE - CLINICAL SUMMARY MEDICAL DECISION MAKING FREE TEXT BOX
Patient presented with atraumatic blackish discoloration to his bilateral fingernails.  Denies having this before.  Otherwise on arrival patient afebrile, hemodynamically stable, fully neurovascularly intact with 2+ pulses bilaterally and good capillary refill.  No discoloration to the toenails.  Patient otherwise completely asymptomatic.  Obtained EKG which was nonischemic.  Obtained labs which were grossly unremarkable including no significant leukocytosis, anemia, signs of dehydration/LAURA, transaminitis or significant electrolyte abnormalities.  Trop negative.  Patient ambulatory without difficulty or desaturation, tolerates p.o.  Given the above, will discharge home with outpatient follow up. Patient agreeable with plan. Agrees to return to ED for any new or worsening symptoms.

## 2022-07-15 ENCOUNTER — FORM ENCOUNTER (OUTPATIENT)
Age: 57
End: 2022-07-15

## 2022-07-16 ENCOUNTER — OUTPATIENT (OUTPATIENT)
Dept: OUTPATIENT SERVICES | Facility: HOSPITAL | Age: 57
LOS: 1 days | Discharge: HOME | End: 2022-07-16

## 2022-07-16 PROCEDURE — 95811 POLYSOM 6/>YRS CPAP 4/> PARM: CPT | Mod: 26

## 2022-07-18 DIAGNOSIS — G47.33 OBSTRUCTIVE SLEEP APNEA (ADULT) (PEDIATRIC): ICD-10-CM

## 2023-10-04 NOTE — ED ADULT NURSE NOTE - PAIN RATING/NUMBER SCALE (0-10): REST
Patient Education   Mediterranean Diet  A Mediterranean diet refers to food and lifestyle choices that are based on the traditions of countries located on the [place]. It focuses on eating more fruits, vegetables, whole grains, beans, nuts, seeds, and heart-healthy fats, and eating less dairy, meat, eggs, and processed foods with added sugar, salt, and fat. This way of eating has been shown to help prevent certain conditions and improve outcomes for people who have chronic diseases, like kidney disease and heart disease.  What are tips for following this plan?  Reading food labels  Check the serving size of packaged foods. For foods such as rice and pasta, the serving size refers to the amount of cooked product, not dry.  Check the total fat in packaged foods. Avoid foods that have saturated fat or trans fats.  Check the ingredient list for added sugars, such as corn syrup.  Shopping    Buy a variety of foods that offer a balanced diet, including:  Fresh fruits and vegetables (produce).  Grains, beans, nuts, and seeds. Some of these may be available in unpackaged forms or large amounts (in bulk).  Fresh seafood.  Poultry and eggs.  Low-fat dairy products.  Buy whole ingredients instead of prepackaged foods.  Buy fresh fruits and vegetables in-season from local [employer] markets.  Buy plain frozen fruits and vegetables.  If you do not have access to quality fresh seafood, buy precooked frozen shrimp or canned fish, such as tuna, salmon, or sardines.  Stock your pantry so you always have certain foods on hand, such as olive oil, canned tuna, canned tomatoes, rice, pasta, and beans.  Cooking  Cook foods with extra-virgin olive oil instead of using butter or other vegetable oils.  Have meat as a side dish, and have vegetables or grains as your main dish. This means having meat in small portions or adding small amounts of meat to foods like pasta or stew.  Use beans or vegetables instead of meat in common dishes  like chili or lasagna.  [place] with different cooking methods. Try roasting, broiling, steaming, and sautéing vegetables.  Add frozen vegetables to soups, stews, pasta, or rice.  Add nuts or seeds for added healthy fats and plant protein at each meal. You can add these to yogurt, salads, or vegetable dishes.  Marinate fish or vegetables using olive oil, lemon juice, garlic, and fresh herbs.  Meal planning  Plan to eat one vegetarian meal one day each week. Try to work up to two vegetarian meals, if possible.  Eat seafood two or more times a week.  Have healthy snacks readily available, such as:  Vegetable sticks with hummus.  Greek yogurt.  Fruit and nut trail mix.  Eat balanced meals throughout the week. This includes:  Fruit: 2-3 servings a day.  Vegetables: 4-5 servings a day.  Low-fat dairy: 2 servings a day.  Fish, poultry, or lean meat: 1 serving a day.  Beans and legumes: 2 or more servings a week.  Nuts and seeds: 1-2 servings a day.  Whole grains: 6-8 servings a day.  Extra-virgin olive oil: 3-4 servings a day.  Limit red meat and sweets to only a few servings a month.  Lifestyle    Cook and eat meals together with your family, when possible.  Drink enough fluid to keep your urine pale yellow.  Be physically active every day. This includes:  Aerobic exercise like running or swimming.  Leisure activities like gardening, walking, or housework.  Get 7-8 hours of sleep each night.  If recommended by your health care provider, drink red wine in moderation. This means 1 glass a day for nonpregnant women and 2 glasses a day for men. A glass of wine equals 5 oz (150 mL).  What foods should I eat?  Fruits  Apples. Apricots. Avocado. Berries. Bananas. Cherries. Dates. Figs. Grapes. Kina. Melon. Oranges. Peaches. Plums. Pomegranate.  Vegetables  Artichokes. Beets. Broccoli. Cabbage. Carrots. Eggplant. Green beans. Chard. Kale. Spinach. Onions. Leeks. Peas. Squash. Tomatoes. Peppers.  Radishes.  Grains  Whole-grain pasta. Brown rice. Bulgur wheat. Polenta. Couscous. Whole-wheat bread. Oatmeal. Quinoa.  Meats and other proteins  Beans. Almonds. Sunflower seeds. Pine nuts. Peanuts. [place]. [place]. Scallops. Shrimp. Tuna. Tilapia. Clams. Oysters. Eggs. Poultry without skin.  Dairy  Low-fat milk. Cheese. Greek yogurt.  Fats and oils  Extra-virgin olive oil. Avocado oil. Grapeseed oil.  Beverages  Water. Red wine. Herbal tea.  Sweets and desserts  Greek yogurt with honey. Baked apples. Poached pears. Trail mix.  Seasonings and condiments  Basil. Cilantro. Coriander. Cumin. Mint. Parsley. Jostin. Rosemary. Tarragon. Garlic. Oregano. Thyme. Pepper. Balsamic vinegar. Tahini. Hummus. Tomato sauce. Olives. Mushrooms.  The items listed above may not be a complete list of foods and beverages you can eat. Contact a dietitian for more information.  What foods should I limit?  This is a list of foods that should be eaten rarely or only on special occasions.  Fruits  Fruit canned in syrup.  Vegetables  Deep-fried potatoes (french fries).  Grains  Prepackaged pasta or rice dishes. Prepackaged cereal with added sugar. Prepackaged snacks with added sugar.  Meats and other proteins  Beef. Pork. Lamb. Poultry with skin. Hot dogs. [place].  Dairy  Ice cream. Sour cream. Whole milk.  Fats and oils  Butter. Canola oil. Vegetable oil. Beef fat (tallow). Lard.  Beverages  Juice. Sugar-sweetened soft drinks. Beer. Liquor and spirits.  Sweets and desserts  Cookies. Cakes. Pies. Candy.  Seasonings and condiments  Mayonnaise. Pre-made sauces and marinades.  The items listed above may not be a complete list of foods and beverages you should limit. Contact a dietitian for more information.  Summary  The Mediterranean diet includes both food and lifestyle choices.  Eat a variety of fresh fruits and vegetables, beans, nuts, seeds, and whole grains.  Limit the amount of red meat and sweets that you eat.  If recommended by your health  care provider, drink red wine in moderation. This means 1 glass a day for nonpregnant women and 2 glasses a day for men. A glass of wine equals 5 oz (150 mL).  This information is not intended to replace advice given to you by your health care provider. Make sure you discuss any questions you have with your health care provider.  Document Revised: 01/22/2021 Document Reviewed: 11/19/2020  Elsevier Patient Education © 2023 [employer]        0

## 2024-01-17 NOTE — ED ADULT TRIAGE NOTE - SOURCE OF INFORMATION
Patient Lot # (Optional): [id number] Performed By: Sun Urine Pregnancy Test Result: negative Detail Level: None Expiration Date (Optional): 03-

## 2024-02-14 ENCOUNTER — NON-APPOINTMENT (OUTPATIENT)
Age: 59
End: 2024-02-14

## 2025-01-22 NOTE — ED PROVIDER NOTE - CCCP TRG CHIEF CMPLNT
medical evaluation
What Type Of Note Output Would You Prefer (Optional)?: Bullet Format
How Severe Are Your Spot(S)?: mild
Have Your Spot(S) Been Treated In The Past?: has not been treated
Hpi Title: Evaluation of Skin Lesions
Additional History: CSE, no spots of concern

## 2025-04-26 NOTE — ED ADULT TRIAGE NOTE - INTERNATIONAL TRAVEL
Pt s/p right foot surgery x4 weeks ago, sent to ER for further evaluation of shortness of breath, upper back pain, and wheezing for the past x1.5 week. Pt endorses +fevers x1 week ago, pt concerned for PE.    No
